# Patient Record
Sex: FEMALE | Race: BLACK OR AFRICAN AMERICAN | Employment: PART TIME | URBAN - METROPOLITAN AREA
[De-identification: names, ages, dates, MRNs, and addresses within clinical notes are randomized per-mention and may not be internally consistent; named-entity substitution may affect disease eponyms.]

---

## 2018-10-18 ENCOUNTER — HOSPITAL ENCOUNTER (EMERGENCY)
Age: 23
Discharge: HOME | End: 2018-10-18
Payer: MEDICAID

## 2018-10-18 VITALS
SYSTOLIC BLOOD PRESSURE: 106 MMHG | DIASTOLIC BLOOD PRESSURE: 77 MMHG | BODY MASS INDEX: 17.63 KG/M2 | OXYGEN SATURATION: 98 % | HEART RATE: 89 BPM | WEIGHT: 105.82 LBS | HEIGHT: 65 IN | RESPIRATION RATE: 16 BRPM | TEMPERATURE: 99.6 F

## 2018-10-18 DIAGNOSIS — M54.50 CHRONIC MIDLINE LOW BACK PAIN WITHOUT SCIATICA: ICD-10-CM

## 2018-10-18 DIAGNOSIS — R21 RASH AND OTHER NONSPECIFIC SKIN ERUPTION: Primary | ICD-10-CM

## 2018-10-18 DIAGNOSIS — G89.29 CHRONIC MIDLINE LOW BACK PAIN WITHOUT SCIATICA: ICD-10-CM

## 2018-10-18 LAB — HCG(URINE) PREGNANCY TEST: NEGATIVE

## 2018-10-18 PROCEDURE — 99283 EMERGENCY DEPT VISIT LOW MDM: CPT

## 2018-10-18 PROCEDURE — 84703 CHORIONIC GONADOTROPIN ASSAY: CPT

## 2018-10-18 RX ORDER — PRENATAL VIT 91/IRON/FOLIC/DHA 28-975-200
COMBINATION PACKAGE (EA) ORAL
Qty: 24 G | Refills: 0 | Status: SHIPPED | OUTPATIENT
Start: 2018-10-18 | End: 2018-12-02

## 2018-10-18 RX ORDER — IBUPROFEN 600 MG/1
600 TABLET ORAL EVERY 6 HOURS PRN
Qty: 30 TABLET | Refills: 0 | Status: SHIPPED | OUTPATIENT
Start: 2018-10-18 | End: 2018-11-16 | Stop reason: ALTCHOICE

## 2018-10-18 ASSESSMENT — PAIN SCALES - GENERAL: PAINLEVEL_OUTOF10: 6

## 2018-10-18 ASSESSMENT — PAIN DESCRIPTION - LOCATION: LOCATION: BACK

## 2018-10-18 ASSESSMENT — PAIN DESCRIPTION - PAIN TYPE: TYPE: CHRONIC PAIN

## 2018-10-18 ASSESSMENT — PAIN DESCRIPTION - ORIENTATION: ORIENTATION: LOWER

## 2018-10-18 NOTE — ED PROVIDER NOTES
[facility] [address] [address]  Dept: [address]: [phone]    eMERGENCY dEPARTMENT eNCOUnter    Evaluated by the Advanced Practice Provider    [address] [place] [address]    Chief Complaint   Patient presents with    Rash     3 days of rash on neck, used antibiotic ointment. new soap used. +itching. HPI    Odilon Martinez is a 26 y.o. female who presents with a rash. Onset was 3 days ago. The duration has been constant. The quality is that it itches. The location is on her neck. Patient complains of associated itching. The patient denies any new foods, medications, lotions or detergents. She has had a new soap, but it is for sensitive skin. She Has tried placing antibiotic ointment on it, but it did not help. She is also requesting a refill of Motrin that she takes for her chronic low back pain. She states she is not having any back pain at the moment, but she works as a , and sometimes longer nights on her feet bothering her back. She is not sure if she could be pregnant. She denies any radiation of her symptoms, extremity weakness, numbness in her groin, bowel or bladder incontinence, history of IV drug abuse or immunosuppression. She has no further complaints at this time. REVIEW OF SYSTEMS    Pulmonary: No difficulty breathing or chest tightness  Skin: see HPI  ENT: No throat swelling or tongue swelling  General: No fevers or syncope  All other systems reviewed and are negative. PAST MEDICAL & SURGICAL HISTORY    Past Medical History:   Diagnosis Date    ADHD     Asthma     Depression     Headache     Hip fracture ([address]     right hip stress fx 2016    Varicella      History reviewed. No pertinent surgical history.     CURRENT MEDICATIONS  (may include discharge medications prescribed in the ED)  Current Outpatient Rx   Medication Sig Dispense Refill    ibuprofen (ADVIL;MOTRIN) 600 MG tablet Take 1 tablet by mouth every 6 hours as needed for Pain 30 tablet 0    terbinafine (LAMISIL) 1 % cream Apply topically 2 times daily. 24 g 0    albuterol sulfate HFA (PROAIR HFA) 108 (90 Base) MCG/ACT inhaler Use every 4 hours while awake for 7-10 days then PRN wheezing  Dispense with SPACER and Instruct on use. May sub Ventolin or Proventil as needed per [employer]. 1 Inhaler 10       ALLERGIES    No Known Allergies    SOCIAL & FAMILY HISTORY    Social History     Social History    Marital status: Single     Spouse name: N/A    Number of children: N/A    Years of education: N/A     Social History Main Topics    Smoking status: Current Every Day Smoker     Types: Cigars    Smokeless tobacco: Never Used    Alcohol use Yes      Comment: occ    Drug use: Yes     Types: Marijuana      Comment: 1-2 times monthly    Sexual activity: No     Other Topics Concern    None     Social History Narrative    None     History reviewed. No pertinent family history. PHYSICAL EXAM    VITAL SIGNS: /77   Pulse 89   Temp 99.6 °F (37.6 °C) (Oral)   Resp 16   Ht 5' 4.5\" (1.638 m)   Wt 105 lb 13.1 oz (48 kg)   SpO2 98%   BMI 17.88 kg/m²   Constitutional:  Well developed, well nourished, no acute distress  HENT:  Atraumatic, no facial or lip swelling  ORAL EXAM:  No tongue swelling, airway patent  NECK:  Supple, No neck swelling  Respiratory:  No respiratory distress, normal breath sounds   Cardiovascular:  regular rate, No JVD, no edema  GI: nondistended, normal bowel sounds, nontender, no organomegaly   Musculoskeletal:  No edema, no acute deformities. With no lumbar tenderness on exam, no deformity, with full range of motion of lumbar back, normal gait. Neuro: Achilles and patellar reflexes 2+ bilaterally, sensation intact bilaterally  Integument:  With round, scaling, dry rash on right neck that is about the size of a $0.50 piece with mild erythema.  There are no petechiae, pustules, purpura, induration,

## 2018-11-16 ENCOUNTER — HOSPITAL ENCOUNTER (EMERGENCY)
Age: 23
Discharge: HOME | End: 2018-11-16
Attending: EMERGENCY MEDICINE
Payer: MEDICAID

## 2018-11-16 VITALS
TEMPERATURE: 98.7 F | DIASTOLIC BLOOD PRESSURE: 60 MMHG | RESPIRATION RATE: 16 BRPM | HEART RATE: 80 BPM | SYSTOLIC BLOOD PRESSURE: 92 MMHG | OXYGEN SATURATION: 98 %

## 2018-11-16 DIAGNOSIS — Z76.0 ENCOUNTER FOR MEDICATION REFILL: Primary | ICD-10-CM

## 2018-11-16 PROCEDURE — 99282 EMERGENCY DEPT VISIT SF MDM: CPT

## 2018-11-16 RX ORDER — ALBUTEROL SULFATE 90 UG/1
AEROSOL, METERED RESPIRATORY (INHALATION)
Qty: 1 INHALER | Refills: 0 | Status: SHIPPED | OUTPATIENT
Start: 2018-11-16 | End: 2018-12-02

## 2018-11-16 NOTE — ED PROVIDER NOTES
topically 2 times daily. 24 g 0     No Known Allergies  Nursing Notes Reviewed    ROS:  At least 10 systems reviewed and otherwise negative except as in the [address] Physical Exam:  ED Triage Vitals [11/16/18 1023]   Enc Vitals Group      BP 92/60      Pulse 80      Resp 16      Temp 98.7 °F (37.1 °C)      Temp Source Oral      SpO2 98 %      Weight       Height       Head Circumference       Peak Flow       Pain Score       Pain Loc       Pain Edu? Excl. in [address]? My pulse oximetry interpretation is which is within the normal range    GENERAL APPEARANCE: Awake and alert. Cooperative. No acute distress. HEAD:  Atraumatic. EYES: EOM's grossly intact. ENT: Mucous membranes are moist.  No trismus. NECK:  Trachea midline. HEART: RRR. Radial pulses 2+. LUNGS: Respirations unlabored. CTAB  ABDOMEN: Soft. Non-tender. No guarding or rebound. EXTREMITIES: No acute deformities. SKIN: Warm and dry. No rash      I have reviewed and interpreted all of the currently available lab results from this visit (if applicable):  No results found for this visit on 11/16/18. EKG: (All EKG's are interpreted by myself in the absence of a cardiologist)      MDM:  Patient's vital signs are stable. She appears in no acute distress. Her lung exam is normal.  I did refill her albuterol inhaler. I do not appreciate any rash but it likely that the patient has some type of eczema or atopic dermatitis. I instructed her to use hydrocortisone as needed if this rash were to return. I did give her PCP referral.    Clinical Impression:  1.  Encounter for medication refill        Disposition Vitals:  [unfilled], [unfilled], [unfilled], [unfilled]    Disposition referral (if applicable):  [facility] Pre-Services  [phone]          Disposition medications (if applicable):  Discharge Medication List as of 11/16/2018 11:00 AM            (Please note that portions of this note may have been completed with a voice

## 2018-12-02 ENCOUNTER — HOSPITAL ENCOUNTER (EMERGENCY)
Age: 23
Discharge: ANOTHER HOSPITAL | End: 2018-12-02
Attending: EMERGENCY MEDICINE
Payer: MEDICAID

## 2018-12-02 VITALS
SYSTOLIC BLOOD PRESSURE: 103 MMHG | DIASTOLIC BLOOD PRESSURE: 69 MMHG | HEART RATE: 71 BPM | TEMPERATURE: 98.5 F | BODY MASS INDEX: 17.93 KG/M2 | OXYGEN SATURATION: 100 % | HEIGHT: 64 IN | WEIGHT: 105 LBS | RESPIRATION RATE: 14 BRPM

## 2018-12-02 DIAGNOSIS — O21.9 VOMITING OF PREGNANCY, ANTEPARTUM: Primary | ICD-10-CM

## 2018-12-02 DIAGNOSIS — N89.8 VAGINAL DISCHARGE: ICD-10-CM

## 2018-12-02 DIAGNOSIS — R11.0 NAUSEA: ICD-10-CM

## 2018-12-02 LAB
BILIRUBIN URINE: NEGATIVE
BLOOD, URINE: ABNORMAL
CLARITY: CLEAR
COLOR: YELLOW
EPITHELIAL CELLS, UA: ABNORMAL /HPF
GLUCOSE URINE: NEGATIVE MG/DL
HCG(URINE) PREGNANCY TEST: POSITIVE
KETONES, URINE: NEGATIVE MG/DL
LEUKOCYTE ESTERASE, URINE: NEGATIVE
MICROSCOPIC EXAMINATION: YES
MUCUS: ABNORMAL /LPF
NITRITE, URINE: NEGATIVE
PH UA: 6.5
PROTEIN UA: NEGATIVE MG/DL
RBC UA: ABNORMAL /HPF (ref 0–2)
SPECIFIC GRAVITY UA: 1.01
URINE REFLEX TO CULTURE: ABNORMAL
URINE TYPE: ABNORMAL
UROBILINOGEN, URINE: 0.2 E.U./DL
WBC UA: ABNORMAL /HPF (ref 0–5)

## 2018-12-02 PROCEDURE — 6360000002 HC RX W HCPCS: Performed by: EMERGENCY MEDICINE

## 2018-12-02 PROCEDURE — 84703 CHORIONIC GONADOTROPIN ASSAY: CPT

## 2018-12-02 PROCEDURE — 99283 EMERGENCY DEPT VISIT LOW MDM: CPT

## 2018-12-02 PROCEDURE — 81001 URINALYSIS AUTO W/SCOPE: CPT

## 2018-12-02 RX ORDER — ONDANSETRON 4 MG/1
4 TABLET, ORALLY DISINTEGRATING ORAL ONCE
Status: COMPLETED | OUTPATIENT
Start: 2018-12-02 | End: 2018-12-02

## 2018-12-02 RX ORDER — ONDANSETRON 4 MG/1
4 TABLET, ORALLY DISINTEGRATING ORAL EVERY 8 HOURS PRN
Qty: 20 TABLET | Refills: 0 | Status: SHIPPED | OUTPATIENT
Start: 2018-12-02 | End: 2018-12-09 | Stop reason: ALTCHOICE

## 2018-12-02 RX ADMIN — ONDANSETRON 4 MG: 4 TABLET, ORALLY DISINTEGRATING ORAL at 06:12

## 2018-12-02 ASSESSMENT — PAIN SCALES - GENERAL: PAINLEVEL_OUTOF10: 5

## 2018-12-02 ASSESSMENT — PAIN - FUNCTIONAL ASSESSMENT: PAIN_FUNCTIONAL_ASSESSMENT: 0-10

## 2018-12-02 NOTE — ED PROVIDER NOTES
Narrative:     Performed at:  [facility]) [facility]  [address] [place], [facility]   Phone [phone]   HCG, QUANTITATIVE, PREGNANCY   CBC   BASIC METABOLIC PANEL   ABO/RH       All other labs were within normal range or not returned as of this dictation. EMERGENCY DEPARTMENT COURSE and DIFFERENTIAL DIAGNOSIS/MDM:   Vitals:    Vitals:    12/02/18 0545   BP: 103/69   Pulse: 71   Resp: 14   Temp: 98.5 °F (36.9 °C)   TempSrc: Oral   SpO2: 100%   Weight: 105 lb (47.6 kg)   Height: 5' 4\" (1.626 m)       MDM  Pt presents with nausea. At presentation VSS. On exam no abdominal TTP. Urine preg is positive. Urinalysis shows blood. Declines pelvic. Will send patient to [place] for ectopic rule out ultrasound and possible RhoGAM if type if Rh -. Will have them draw blood as we do not have machine for blood typing. REASSESSMENT          CRITICAL CARE TIME   Total Critical Care time was 0 minutes, excluding separately reportable procedures. There was a high probability of clinically significant/life threatening deteriorationin the patient's condition which required my urgent intervention. CONSULTS:  None     PROCEDURES:  Unless otherwise noted below, none     Procedures    FINAL IMPRESSION      1. Vomiting of pregnancy, antepartum    2. Nausea    3.  Vaginal discharge          DISPOSITION/PLAN   DISPOSITION Decision To Transfer 12/02/2018 06:29:47 AM      PATIENT REFERRED TO:  [facility] [id number]  [address] Pre-Services  [phone]          DISCHARGE MEDICATIONS:  New Prescriptions    ONDANSETRON (ZOFRAN ODT) 4 MG DISINTEGRATING TABLET    Take 1 tablet by mouth every 8 hours as needed for Nausea          (Please note that portions of this note were completed with a voicerecognition program.  Efforts were made to edit the dictations but occasionally words are

## 2018-12-02 NOTE — ED NOTES
Reviewed instructions with patient on importance of going to [facility] for further testing.  Patient verb under, to [facility] per self     Ana Hale RN  12/02/18 1073

## 2018-12-09 ENCOUNTER — HOSPITAL ENCOUNTER (EMERGENCY)
Age: 23
Discharge: HOME | End: 2018-12-09
Attending: EMERGENCY MEDICINE
Payer: MEDICAID

## 2018-12-09 VITALS
HEART RATE: 71 BPM | HEIGHT: 65 IN | WEIGHT: 107.58 LBS | OXYGEN SATURATION: 99 % | TEMPERATURE: 98.2 F | BODY MASS INDEX: 17.92 KG/M2 | RESPIRATION RATE: 16 BRPM | SYSTOLIC BLOOD PRESSURE: 109 MMHG | DIASTOLIC BLOOD PRESSURE: 77 MMHG

## 2018-12-09 DIAGNOSIS — O20.9 FIRST TRIMESTER BLEEDING: ICD-10-CM

## 2018-12-09 DIAGNOSIS — O21.9 NAUSEA/VOMITING IN PREGNANCY: Primary | ICD-10-CM

## 2018-12-09 LAB
ABO/RH: NORMAL
ANION GAP SERPL CALCULATED.3IONS-SCNC: 12 MMOL/L (ref 3–16)
BASOPHILS ABSOLUTE: 0 K/UL (ref 0–0.2)
BASOPHILS RELATIVE PERCENT: 0.3 %
BILIRUBIN URINE: NEGATIVE
BLOOD, URINE: NEGATIVE
BUN BLDV-MCNC: 8 MG/DL (ref 7–20)
CALCIUM SERPL-MCNC: 9.4 MG/DL (ref 8.3–10.6)
CHLORIDE BLD-SCNC: 102 MMOL/L (ref 99–110)
CLARITY: CLEAR
CO2: 23 MMOL/L (ref 21–32)
COLOR: YELLOW
CREAT SERPL-MCNC: <0.5 MG/DL (ref 0.6–1.1)
EOSINOPHILS ABSOLUTE: 0 K/UL (ref 0–0.6)
EOSINOPHILS RELATIVE PERCENT: 0.1 %
GFR AFRICAN AMERICAN: >60
GFR NON-AFRICAN AMERICAN: >60
GLUCOSE BLD-MCNC: 87 MG/DL (ref 70–99)
GLUCOSE URINE: NEGATIVE MG/DL
GONADOTROPIN, CHORIONIC (HCG) QUANT: NORMAL MIU/ML
HCT VFR BLD CALC: 36.1 % (ref 36–48)
HEMOGLOBIN: 12.4 G/DL (ref 12–16)
KETONES, URINE: 15 MG/DL
LEUKOCYTE ESTERASE, URINE: NEGATIVE
LYMPHOCYTES ABSOLUTE: 1.3 K/UL (ref 1–5.1)
LYMPHOCYTES RELATIVE PERCENT: 14.9 %
MCH RBC QN AUTO: 32.6 PG (ref 26–34)
MCHC RBC AUTO-ENTMCNC: 34.4 G/DL (ref 31–36)
MCV RBC AUTO: 94.9 FL (ref 80–100)
MICROSCOPIC EXAMINATION: ABNORMAL
MONOCYTES ABSOLUTE: 0.9 K/UL (ref 0–1.3)
MONOCYTES RELATIVE PERCENT: 10.9 %
NEUTROPHILS ABSOLUTE: 6.2 K/UL (ref 1.7–7.7)
NEUTROPHILS RELATIVE PERCENT: 73.8 %
NITRITE, URINE: NEGATIVE
PDW BLD-RTO: 12.2 % (ref 12.4–15.4)
PH UA: 7
PLATELET # BLD: 216 K/UL (ref 135–450)
PMV BLD AUTO: 8.1 FL (ref 5–10.5)
POTASSIUM REFLEX MAGNESIUM: 3.7 MMOL/L (ref 3.5–5.1)
PROTEIN UA: NEGATIVE MG/DL
RBC # BLD: 3.8 M/UL (ref 4–5.2)
SODIUM BLD-SCNC: 137 MMOL/L (ref 136–145)
SPECIFIC GRAVITY UA: 1.01
URINE REFLEX TO CULTURE: ABNORMAL
URINE TYPE: ABNORMAL
UROBILINOGEN, URINE: 0.2 E.U./DL
WBC # BLD: 8.4 K/UL (ref 4–11)

## 2018-12-09 PROCEDURE — 96361 HYDRATE IV INFUSION ADD-ON: CPT

## 2018-12-09 PROCEDURE — 6360000002 HC RX W HCPCS: Performed by: EMERGENCY MEDICINE

## 2018-12-09 PROCEDURE — 86900 BLOOD TYPING SEROLOGIC ABO: CPT

## 2018-12-09 PROCEDURE — 80048 BASIC METABOLIC PNL TOTAL CA: CPT

## 2018-12-09 PROCEDURE — 2580000003 HC RX 258: Performed by: EMERGENCY MEDICINE

## 2018-12-09 PROCEDURE — 99283 EMERGENCY DEPT VISIT LOW MDM: CPT

## 2018-12-09 PROCEDURE — 6370000000 HC RX 637 (ALT 250 FOR IP): Performed by: EMERGENCY MEDICINE

## 2018-12-09 PROCEDURE — 96374 THER/PROPH/DIAG INJ IV PUSH: CPT

## 2018-12-09 PROCEDURE — 81003 URINALYSIS AUTO W/O SCOPE: CPT

## 2018-12-09 PROCEDURE — 85025 COMPLETE CBC W/AUTO DIFF WBC: CPT

## 2018-12-09 PROCEDURE — 36415 COLL VENOUS BLD VENIPUNCTURE: CPT

## 2018-12-09 PROCEDURE — 84702 CHORIONIC GONADOTROPIN TEST: CPT

## 2018-12-09 PROCEDURE — 86901 BLOOD TYPING SEROLOGIC RH(D): CPT

## 2018-12-09 RX ORDER — 0.9 % SODIUM CHLORIDE 0.9 %
500 INTRAVENOUS SOLUTION INTRAVENOUS ONCE
Status: COMPLETED | OUTPATIENT
Start: 2018-12-09 | End: 2018-12-09

## 2018-12-09 RX ORDER — ACETAMINOPHEN 500 MG
500 TABLET ORAL 4 TIMES DAILY PRN
Qty: 60 TABLET | Refills: 0 | Status: SHIPPED | OUTPATIENT
Start: 2018-12-09 | End: 2018-12-16 | Stop reason: SDUPTHER

## 2018-12-09 RX ORDER — ONDANSETRON 4 MG/1
4 TABLET, ORALLY DISINTEGRATING ORAL EVERY 8 HOURS PRN
Qty: 20 TABLET | Refills: 0 | Status: SHIPPED | OUTPATIENT
Start: 2018-12-09 | End: 2018-12-16 | Stop reason: SDUPTHER

## 2018-12-09 RX ORDER — ONDANSETRON 2 MG/ML
4 INJECTION INTRAMUSCULAR; INTRAVENOUS ONCE
Status: COMPLETED | OUTPATIENT
Start: 2018-12-09 | End: 2018-12-09

## 2018-12-09 RX ORDER — DOXYLAMINE SUCCINATE AND PYRIDOXINE HYDROCHLORIDE, DELAYED RELEASE TABLETS 10 MG/10 MG 10; 10 MG/1; MG/1
TABLET, DELAYED RELEASE ORAL
Qty: 60 TABLET | Refills: 1 | Status: SHIPPED | OUTPATIENT
Start: 2018-12-09 | End: 2019-02-22 | Stop reason: ALTCHOICE

## 2018-12-09 RX ORDER — ACETAMINOPHEN 325 MG/1
650 TABLET ORAL ONCE
Status: COMPLETED | OUTPATIENT
Start: 2018-12-09 | End: 2018-12-09

## 2018-12-09 RX ADMIN — ONDANSETRON 4 MG: 2 INJECTION INTRAMUSCULAR; INTRAVENOUS at 06:31

## 2018-12-09 RX ADMIN — SODIUM CHLORIDE 500 ML: 9 INJECTION, SOLUTION INTRAVENOUS at 06:31

## 2018-12-09 RX ADMIN — ACETAMINOPHEN 650 MG: 325 TABLET, FILM COATED ORAL at 06:31

## 2018-12-09 ASSESSMENT — PAIN SCALES - GENERAL: PAINLEVEL_OUTOF10: 0

## 2018-12-09 NOTE — ED PROVIDER NOTES
Packs/day: 0.10     Types: Cigars    Smokeless tobacco: Never Used    Alcohol use Yes      Comment: occ    Drug use: Yes     Types: Marijuana      Comment: 1-2 times monthly    Sexual activity: No     Other Topics Concern    Not on file     Social History Narrative    No narrative on file       SCREENINGS             PHYSICAL EXAM    (up to 7 for level 4, 8 or more for level 5)     ED Triage Vitals [12/09/18 0552]   BP Temp Temp Source Pulse Resp SpO2 Height Weight   109/77 98.2 °F (36.8 °C) Oral 71 16 99 % 5' 4.5\" (1.638 m) 107 lb 9.4 oz (48.8 kg)       Physical Exam   Constitutional: She appears well-developed and well-nourished. HENT:   Head: Normocephalic and atraumatic. Dried lips but moist mucous membranes   Eyes: Conjunctivae are normal. Right eye exhibits no discharge. Left eye exhibits no discharge. Cardiovascular: Normal rate. Pulmonary/Chest: Effort normal. No respiratory distress. Abdominal: Soft. She exhibits no distension. Mild suprapubic tenderness without any rebound rigidity or guarding, no CVA tenderness. Genitourinary: Pelvic exam was performed with patient supine. Genitourinary Comments: No gross external bleeding. On bimanual exam the patient's cervix is long and closed. Scant amount of brown discharge on gloves. Minimal tenderness   Neurological: She is alert. Skin: No pallor. Psychiatric: She has a normal mood and affect. Her behavior is normal.   Nursing note and vitals reviewed.           DIAGNOSTIC RESULTS     EKG: All EKG's are interpreted by the Emergency Department Physician who either signs or Co-signsthis chart in the absence of a cardiologist.        RADIOLOGY:   Non-plain filmimages such as CT, Ultrasound and MRI are read by the radiologist. Plain radiographic images are visualized and preliminarily interpreted by the emergency physician with the below findings:        Interpretation per the Radiologist below, if available at the time ofthis note:    No

## 2018-12-11 ENCOUNTER — HOSPITAL ENCOUNTER (EMERGENCY)
Age: 23
Discharge: HOME | End: 2018-12-11
Payer: MEDICAID

## 2018-12-11 VITALS
HEIGHT: 65 IN | TEMPERATURE: 98.8 F | WEIGHT: 105.82 LBS | DIASTOLIC BLOOD PRESSURE: 71 MMHG | RESPIRATION RATE: 12 BRPM | BODY MASS INDEX: 17.63 KG/M2 | HEART RATE: 77 BPM | SYSTOLIC BLOOD PRESSURE: 117 MMHG | OXYGEN SATURATION: 97 %

## 2018-12-11 DIAGNOSIS — R10.30 PREGNANCY RELATED BILATERAL LOWER ABDOMINAL CRAMPING, ANTEPARTUM: Primary | ICD-10-CM

## 2018-12-11 DIAGNOSIS — N93.9 VAGINAL SPOTTING: ICD-10-CM

## 2018-12-11 DIAGNOSIS — O99.891 PREGNANCY RELATED BILATERAL LOWER ABDOMINAL CRAMPING, ANTEPARTUM: Primary | ICD-10-CM

## 2018-12-11 LAB
ANION GAP SERPL CALCULATED.3IONS-SCNC: 14 MMOL/L (ref 3–16)
BASOPHILS ABSOLUTE: 0 K/UL (ref 0–0.2)
BASOPHILS RELATIVE PERCENT: 0.4 %
BILIRUBIN URINE: NEGATIVE
BLOOD, URINE: ABNORMAL
BUN BLDV-MCNC: 9 MG/DL (ref 7–20)
CALCIUM SERPL-MCNC: 9.4 MG/DL (ref 8.3–10.6)
CHLORIDE BLD-SCNC: 100 MMOL/L (ref 99–110)
CLARITY: CLEAR
CO2: 22 MMOL/L (ref 21–32)
COLOR: YELLOW
CREAT SERPL-MCNC: <0.5 MG/DL (ref 0.6–1.1)
EOSINOPHILS ABSOLUTE: 0 K/UL (ref 0–0.6)
EOSINOPHILS RELATIVE PERCENT: 0.3 %
EPITHELIAL CELLS, UA: 4 /HPF (ref 0–5)
GFR AFRICAN AMERICAN: >60
GFR NON-AFRICAN AMERICAN: >60
GLUCOSE BLD-MCNC: 84 MG/DL (ref 70–99)
GLUCOSE URINE: NEGATIVE MG/DL
GONADOTROPIN, CHORIONIC (HCG) QUANT: NORMAL MIU/ML
HCT VFR BLD CALC: 37.3 % (ref 36–48)
HEMOGLOBIN: 12.5 G/DL (ref 12–16)
HYALINE CASTS: 14 /LPF (ref 0–8)
KETONES, URINE: >=80 MG/DL
LEUKOCYTE ESTERASE, URINE: ABNORMAL
LYMPHOCYTES ABSOLUTE: 1.4 K/UL (ref 1–5.1)
LYMPHOCYTES RELATIVE PERCENT: 15.3 %
MAGNESIUM: 1.8 MG/DL (ref 1.8–2.4)
MCH RBC QN AUTO: 31.9 PG (ref 26–34)
MCHC RBC AUTO-ENTMCNC: 33.5 G/DL (ref 31–36)
MCV RBC AUTO: 95 FL (ref 80–100)
MICROSCOPIC EXAMINATION: YES
MONOCYTES ABSOLUTE: 1.1 K/UL (ref 0–1.3)
MONOCYTES RELATIVE PERCENT: 12.5 %
NEUTROPHILS ABSOLUTE: 6.3 K/UL (ref 1.7–7.7)
NEUTROPHILS RELATIVE PERCENT: 71.5 %
NITRITE, URINE: NEGATIVE
PDW BLD-RTO: 12.3 % (ref 12.4–15.4)
PH UA: 6
PLATELET # BLD: 233 K/UL (ref 135–450)
PMV BLD AUTO: 7.2 FL (ref 5–10.5)
POTASSIUM REFLEX MAGNESIUM: 3.5 MMOL/L (ref 3.5–5.1)
PROTEIN UA: ABNORMAL MG/DL
RBC # BLD: 3.93 M/UL (ref 4–5.2)
RBC UA: 2 /HPF (ref 0–4)
SODIUM BLD-SCNC: 136 MMOL/L (ref 136–145)
SPECIFIC GRAVITY UA: 1.03
URINE REFLEX TO CULTURE: YES
URINE TYPE: ABNORMAL
UROBILINOGEN, URINE: 1 E.U./DL
WBC # BLD: 8.9 K/UL (ref 4–11)
WBC UA: 5 /HPF (ref 0–5)

## 2018-12-11 PROCEDURE — 83735 ASSAY OF MAGNESIUM: CPT

## 2018-12-11 PROCEDURE — 84702 CHORIONIC GONADOTROPIN TEST: CPT

## 2018-12-11 PROCEDURE — 99284 EMERGENCY DEPT VISIT MOD MDM: CPT

## 2018-12-11 PROCEDURE — 87086 URINE CULTURE/COLONY COUNT: CPT

## 2018-12-11 PROCEDURE — 80048 BASIC METABOLIC PNL TOTAL CA: CPT

## 2018-12-11 PROCEDURE — 81001 URINALYSIS AUTO W/SCOPE: CPT

## 2018-12-11 PROCEDURE — 85025 COMPLETE CBC W/AUTO DIFF WBC: CPT

## 2018-12-11 ASSESSMENT — PAIN SCALES - GENERAL: PAINLEVEL_OUTOF10: 10

## 2018-12-11 ASSESSMENT — ENCOUNTER SYMPTOMS
CHEST TIGHTNESS: 0
VOMITING: 0
ABDOMINAL PAIN: 1
SHORTNESS OF BREATH: 0
NAUSEA: 0
COLOR CHANGE: 0

## 2018-12-11 ASSESSMENT — PAIN DESCRIPTION - DESCRIPTORS: DESCRIPTORS: CRAMPING

## 2018-12-11 ASSESSMENT — PAIN DESCRIPTION - LOCATION: LOCATION: ABDOMEN

## 2018-12-11 NOTE — ED PROVIDER NOTES
[facility]  eMERGENCY dEPARTMENT eNCOUnter        Pt Name: Cintia Gomes  MRN: [id number]  [place] 1995  Date of evaluation: 2018  Provider: Vanesa Ma PA-C  PCP: No primary care provider on file. This patient was not seen and evaluated by the attending physician       [address] [place] [address]       Chief Complaint   Patient presents with    Abdominal Pain     pt c/o abdominal cramping and spotting for 3 days       HISTORY OF PRESENT ILLNESS   (Location/Symptom, Timing/Onset, Context/Setting, Quality, Duration, Modifying Factors, Severity)  Note limiting factors. Cintia Gomes is a 26 y.o. female presents emergency department by private vehicle complaining of lower abdominal cramping and vaginal spotting. Patient currently 7 weeks pregnant. Has had ultrasound confirming intrauterine pregnancy. Has not been seen by Zach Valencia for this pregnancy. Patient . Patient blood type O positive. Patient states symptoms began 3 days ago and have been intermittent since. Cramping rated 10/10 when present. Cramping comes and goes and is located centrally. Vaginal spotting has been intermittent initially was bright red, now dark brown color. Has been wearing a pad, has not had to change pads right really. No passage of clots or tissue. She has has mild nausea and vomiting associated with early pregnancy. These antiemetics have helped nausea vomiting, denies currently. Denies any fevers, chills, urinary frequency/urgency/hesitancy. Denies any concern for STDs. Nursing Notes were all reviewed and agreed with or any disagreements were addressed  in the HPI. REVIEW OF SYSTEMS    (2-9 systems for level 4, 10 or more for level 5)     Review of Systems   Constitutional: Negative for chills and fever. HENT: Negative. Respiratory: Negative for chest tightness and shortness of breath. Cardiovascular: Negative.     Gastrointestinal: Positive for medications:  Medications - No data to display    Patient  presents to the ED with the HPI noted above. She is hemodynamically stable, afebrile and nontoxic-appearing. She is not hypoxic with oxygen saturation 97% on room air. Differential diagnosis includes threatened , incomplete , inevitable , urinary tract infection, STD, subchorionic hemorrhage, implantation bleeding, other. Patient states she's had intrauterine pregnancy confirming IUP. Unable to find these results and her medical records. My attending did a bedside ultrasound which showed single live intrauterine pregnancy with estimated gestational age is 7 weeks 0 days. Heart rate at 148 bpm.  Beta hCG quantitative 102,500, this trending appropriately from previous on 18 at 45,709. Physical exam as above. Urinalysis showed ketones, no evidence of acute infection. CBC showed no leukocytosis or anemia. No additional workup indicated this time. Patient told to continue hydrating at home. Tolerate oral PO in ED without difficulty. Has not picked up medications prescribed at previous evaluation for nausea and vomiting in early pregnancy. Patient has an ObGyn, told to follow-up in 2 days if possible for reevaluation. Patient was understanding. Total contact her ObGyn and informed of symptoms. Educated on no intercourse, no heavy lifting until cleared by [place]. Patient discharged home in stable condition. FINAL IMPRESSION      1. Pregnancy related bilateral lower abdominal cramping, antepartum    2. Vaginal spotting          DISPOSITION/PLAN   DISPOSITION Decision To Discharge 2018 02:16:19 PM      PATIENT REFERREDTO:  [facility] Emergency Department   [facility]  [phone]  Go to   If symptoms worsen    [facility]  [address]   [place] [address]  [address] [facility] [address] [facility]  Call in 2 days  For follow up and reevaluation. (your OBGYN to arrange for close follow up and reevaluation, inform them of your symptoms and that you were in ED)      DISCHARGE MEDICATIONS:  New Prescriptions    No medications on file       DISCONTINUED MEDICATIONS:  Discontinued Medications    No medications on file              (Please note that portions ofthis note were completed with a voice recognition program.  Efforts were made to edit the dictations but occasionally words are mis-transcribed.)    Tomás Holden PA-C (electronically signed)           Tomás Holden PA-C  12/11/18 5047

## 2018-12-12 LAB — URINE CULTURE, ROUTINE: NORMAL

## 2018-12-16 ENCOUNTER — HOSPITAL ENCOUNTER (EMERGENCY)
Age: 23
Discharge: HOME | End: 2018-12-16
Payer: MEDICAID

## 2018-12-16 VITALS
BODY MASS INDEX: 17.78 KG/M2 | HEIGHT: 65 IN | WEIGHT: 106.7 LBS | HEART RATE: 75 BPM | SYSTOLIC BLOOD PRESSURE: 124 MMHG | RESPIRATION RATE: 16 BRPM | TEMPERATURE: 97.7 F | OXYGEN SATURATION: 100 % | DIASTOLIC BLOOD PRESSURE: 71 MMHG

## 2018-12-16 DIAGNOSIS — O20.0 THREATENED MISCARRIAGE: Primary | ICD-10-CM

## 2018-12-16 LAB
A/G RATIO: 1.5 (ref 1.1–2.2)
ALBUMIN SERPL-MCNC: 4.5 G/DL (ref 3.4–5)
ALP BLD-CCNC: 41 U/L (ref 40–129)
ALT SERPL-CCNC: 12 U/L (ref 10–40)
ANION GAP SERPL CALCULATED.3IONS-SCNC: 12 MMOL/L (ref 3–16)
AST SERPL-CCNC: 12 U/L (ref 15–37)
BASOPHILS ABSOLUTE: 0 K/UL (ref 0–0.2)
BASOPHILS RELATIVE PERCENT: 0.2 %
BILIRUB SERPL-MCNC: 0.8 MG/DL (ref 0–1)
BUN BLDV-MCNC: 8 MG/DL (ref 7–20)
CALCIUM SERPL-MCNC: 9.6 MG/DL (ref 8.3–10.6)
CHLORIDE BLD-SCNC: 100 MMOL/L (ref 99–110)
CO2: 23 MMOL/L (ref 21–32)
CREAT SERPL-MCNC: <0.5 MG/DL (ref 0.6–1.1)
EOSINOPHILS ABSOLUTE: 0 K/UL (ref 0–0.6)
EOSINOPHILS RELATIVE PERCENT: 0.3 %
GFR AFRICAN AMERICAN: >60
GFR NON-AFRICAN AMERICAN: >60
GLOBULIN: 3.1 G/DL
GLUCOSE BLD-MCNC: 80 MG/DL (ref 70–99)
GONADOTROPIN, CHORIONIC (HCG) QUANT: NORMAL MIU/ML
HCT VFR BLD CALC: 39 % (ref 36–48)
HEMOGLOBIN: 13.2 G/DL (ref 12–16)
LIPASE: 12 U/L (ref 13–60)
LYMPHOCYTES ABSOLUTE: 1.5 K/UL (ref 1–5.1)
LYMPHOCYTES RELATIVE PERCENT: 17.7 %
MCH RBC QN AUTO: 32 PG (ref 26–34)
MCHC RBC AUTO-ENTMCNC: 33.9 G/DL (ref 31–36)
MCV RBC AUTO: 94.3 FL (ref 80–100)
MONOCYTES ABSOLUTE: 0.9 K/UL (ref 0–1.3)
MONOCYTES RELATIVE PERCENT: 11.4 %
NEUTROPHILS ABSOLUTE: 5.8 K/UL (ref 1.7–7.7)
NEUTROPHILS RELATIVE PERCENT: 70.4 %
PDW BLD-RTO: 12 % (ref 12.4–15.4)
PLATELET # BLD: 245 K/UL (ref 135–450)
PMV BLD AUTO: 7.6 FL (ref 5–10.5)
POTASSIUM REFLEX MAGNESIUM: 3.7 MMOL/L (ref 3.5–5.1)
RBC # BLD: 4.14 M/UL (ref 4–5.2)
SODIUM BLD-SCNC: 135 MMOL/L (ref 136–145)
TOTAL PROTEIN: 7.6 G/DL (ref 6.4–8.2)
WBC # BLD: 8.2 K/UL (ref 4–11)

## 2018-12-16 PROCEDURE — 99284 EMERGENCY DEPT VISIT MOD MDM: CPT

## 2018-12-16 PROCEDURE — 96361 HYDRATE IV INFUSION ADD-ON: CPT

## 2018-12-16 PROCEDURE — 2580000003 HC RX 258: Performed by: PHYSICIAN ASSISTANT

## 2018-12-16 PROCEDURE — 83690 ASSAY OF LIPASE: CPT

## 2018-12-16 PROCEDURE — 84702 CHORIONIC GONADOTROPIN TEST: CPT

## 2018-12-16 PROCEDURE — 6360000002 HC RX W HCPCS: Performed by: PHYSICIAN ASSISTANT

## 2018-12-16 PROCEDURE — 80053 COMPREHEN METABOLIC PANEL: CPT

## 2018-12-16 PROCEDURE — 85025 COMPLETE CBC W/AUTO DIFF WBC: CPT

## 2018-12-16 PROCEDURE — 96374 THER/PROPH/DIAG INJ IV PUSH: CPT

## 2018-12-16 RX ORDER — ONDANSETRON 4 MG/1
4-8 TABLET, ORALLY DISINTEGRATING ORAL EVERY 8 HOURS PRN
Qty: 20 TABLET | Refills: 0 | Status: SHIPPED | OUTPATIENT
Start: 2018-12-16 | End: 2019-02-22 | Stop reason: ALTCHOICE

## 2018-12-16 RX ORDER — ACETAMINOPHEN 500 MG
1000 TABLET ORAL EVERY 6 HOURS PRN
Qty: 30 TABLET | Refills: 0 | Status: SHIPPED | OUTPATIENT
Start: 2018-12-16 | End: 2018-12-27

## 2018-12-16 RX ORDER — SODIUM CHLORIDE, SODIUM LACTATE, POTASSIUM CHLORIDE, CALCIUM CHLORIDE 600; 310; 30; 20 MG/100ML; MG/100ML; MG/100ML; MG/100ML
1000 INJECTION, SOLUTION INTRAVENOUS ONCE
Status: COMPLETED | OUTPATIENT
Start: 2018-12-16 | End: 2018-12-16

## 2018-12-16 RX ORDER — ONDANSETRON 2 MG/ML
4 INJECTION INTRAMUSCULAR; INTRAVENOUS EVERY 30 MIN PRN
Status: DISCONTINUED | OUTPATIENT
Start: 2018-12-16 | End: 2018-12-16 | Stop reason: HOSPADM

## 2018-12-16 RX ORDER — ACETAMINOPHEN 500 MG
1000 TABLET ORAL ONCE
Status: DISCONTINUED | OUTPATIENT
Start: 2018-12-16 | End: 2018-12-16 | Stop reason: HOSPADM

## 2018-12-16 RX ADMIN — ONDANSETRON 4 MG: 2 INJECTION INTRAMUSCULAR; INTRAVENOUS at 09:43

## 2018-12-16 RX ADMIN — SODIUM CHLORIDE, POTASSIUM CHLORIDE, SODIUM LACTATE AND CALCIUM CHLORIDE 1000 ML: 600; 310; 30; 20 INJECTION, SOLUTION INTRAVENOUS at 09:43

## 2018-12-16 ASSESSMENT — PAIN DESCRIPTION - PAIN TYPE: TYPE: ACUTE PAIN

## 2018-12-16 ASSESSMENT — PAIN SCALES - GENERAL
PAINLEVEL_OUTOF10: 5
PAINLEVEL_OUTOF10: 0

## 2018-12-16 NOTE — ED PROVIDER NOTES
increase to 1 tab p.o. q.a.m., 1 tab p.o. q. midafternoon and 2 tabs p.o. q.h.s.  4 tabs per day. If unable to afford, instruct the patient about substituting the OTC components. 60 tablet 1       ALLERGIES    No Known Allergies    FAMILY AND SOCIAL HISTORY    No family history on file. Social History     Social History    Marital status: Single     Spouse name: N/A    Number of children: N/A    Years of education: N/A     Social History Main Topics    Smoking status: Former Smoker    Smokeless tobacco: Never Used    Alcohol use No    Drug use: No      Comment: 1-2 times monthly    Sexual activity: No     Other Topics Concern    Not on file     Social History Narrative    No narrative on file       PHYSICAL EXAM    VITAL SIGNS: /68   Pulse 75   Temp 97.7 °F (36.5 °C) (Oral)   Resp 16   Ht 5' 4.5\" (1.638 m)   Wt 106 lb 11.2 oz (48.4 kg)   LMP 10/21/2018   SpO2 100%   BMI 18.03 kg/m²    Constitutional:  Well-developed, well-nourished, appears comfortable  Eyes:  Non-icteric sclera, no conjunctival injection   HENT:  Atraumatic, external nose normal.   NECK: Supple, no JVD   Respiratory:  No respiratory distress, normal breath sounds   Cardiovascular:  regular rate, no murmurs  GI:  Soft, +mild suprapubic abdominal tenderness, bowel sounds unremarkable   Musculoskeletal: No edema, no deformities  Integument:  Nondiaphoretic skin, no obvious rashes  Neurologic: Awake and oriented, no slurred speech    ED COURSE & MEDICAL DECISION MAKING    Pertinent Labs & Imaging studies reviewed and interpreted. (See chart for details)  See chart for details of medications given during the ED stay.     Vitals:    12/16/18 0821 12/16/18 0830 12/16/18 0845 12/16/18 0900   BP: 112/71 113/62 105/70 104/68   Pulse: 75      Resp: 16      Temp: 97.7 °F (36.5 °C)      TempSrc: Oral      SpO2: 100% 100% 100% 100%   Weight:       Height:           Differential diagnosis: ectopic pregnancy, molar pregnancy, miscarriage,

## 2018-12-16 NOTE — ED NOTES
Patient verbalized understanding of discharge instructions and follow-up care. Patient was given 3 prescriptions and verbalized understanding of instructions for said prescriptions. Breathing unlabored, skin warm, patient alert. Patient ambulated out of emergency department with steady gait to private vehicle.        Fernando Matt RN  12/16/18 0313

## 2018-12-22 ENCOUNTER — APPOINTMENT (OUTPATIENT)
Dept: ULTRASOUND IMAGING | Age: 23
End: 2018-12-22
Payer: MEDICAID

## 2018-12-22 ENCOUNTER — HOSPITAL ENCOUNTER (EMERGENCY)
Age: 23
Discharge: HOME | End: 2018-12-22
Attending: EMERGENCY MEDICINE
Payer: MEDICAID

## 2018-12-22 VITALS
OXYGEN SATURATION: 98 % | WEIGHT: 111.99 LBS | SYSTOLIC BLOOD PRESSURE: 109 MMHG | TEMPERATURE: 98.4 F | BODY MASS INDEX: 18.93 KG/M2 | HEART RATE: 78 BPM | RESPIRATION RATE: 16 BRPM | DIASTOLIC BLOOD PRESSURE: 74 MMHG

## 2018-12-22 DIAGNOSIS — R10.9 ABDOMINAL PAIN DURING PREGNANCY IN FIRST TRIMESTER: ICD-10-CM

## 2018-12-22 DIAGNOSIS — F41.1 ANXIETY STATE: Primary | ICD-10-CM

## 2018-12-22 DIAGNOSIS — O26.891 ABDOMINAL PAIN DURING PREGNANCY IN FIRST TRIMESTER: ICD-10-CM

## 2018-12-22 DIAGNOSIS — Z3A.09 9 WEEKS GESTATION OF PREGNANCY: ICD-10-CM

## 2018-12-22 DIAGNOSIS — O20.9 VAGINAL BLEEDING IN PREGNANCY, FIRST TRIMESTER: ICD-10-CM

## 2018-12-22 LAB
A/G RATIO: 1.3 (ref 1.1–2.2)
ABO/RH: NORMAL
ALBUMIN SERPL-MCNC: 3.7 G/DL (ref 3.4–5)
ALP BLD-CCNC: 37 U/L (ref 40–129)
ALT SERPL-CCNC: 14 U/L (ref 10–40)
ANION GAP SERPL CALCULATED.3IONS-SCNC: 15 MMOL/L (ref 3–16)
AST SERPL-CCNC: 14 U/L (ref 15–37)
BASOPHILS ABSOLUTE: 0 K/UL (ref 0–0.2)
BASOPHILS RELATIVE PERCENT: 0.4 %
BILIRUB SERPL-MCNC: 0.4 MG/DL (ref 0–1)
BUN BLDV-MCNC: 12 MG/DL (ref 7–20)
CALCIUM SERPL-MCNC: 8.7 MG/DL (ref 8.3–10.6)
CHLORIDE BLD-SCNC: 100 MMOL/L (ref 99–110)
CO2: 18 MMOL/L (ref 21–32)
CREAT SERPL-MCNC: <0.5 MG/DL (ref 0.6–1.1)
EOSINOPHILS ABSOLUTE: 0 K/UL (ref 0–0.6)
EOSINOPHILS RELATIVE PERCENT: 0.3 %
GFR AFRICAN AMERICAN: >60
GFR NON-AFRICAN AMERICAN: >60
GLOBULIN: 2.9 G/DL
GLUCOSE BLD-MCNC: 83 MG/DL (ref 70–99)
GONADOTROPIN, CHORIONIC (HCG) QUANT: NORMAL MIU/ML
HCT VFR BLD CALC: 34.4 % (ref 36–48)
HEMOGLOBIN: 12 G/DL (ref 12–16)
LYMPHOCYTES ABSOLUTE: 1.5 K/UL (ref 1–5.1)
LYMPHOCYTES RELATIVE PERCENT: 14.3 %
MAGNESIUM: 1.8 MG/DL (ref 1.8–2.4)
MCH RBC QN AUTO: 32.3 PG (ref 26–34)
MCHC RBC AUTO-ENTMCNC: 34.8 G/DL (ref 31–36)
MCV RBC AUTO: 92.6 FL (ref 80–100)
MONOCYTES ABSOLUTE: 0.9 K/UL (ref 0–1.3)
MONOCYTES RELATIVE PERCENT: 8 %
NEUTROPHILS ABSOLUTE: 8.3 K/UL (ref 1.7–7.7)
NEUTROPHILS RELATIVE PERCENT: 77 %
PDW BLD-RTO: 12.3 % (ref 12.4–15.4)
PLATELET # BLD: 216 K/UL (ref 135–450)
PMV BLD AUTO: 7.8 FL (ref 5–10.5)
POTASSIUM REFLEX MAGNESIUM: 3.5 MMOL/L (ref 3.5–5.1)
RBC # BLD: 3.72 M/UL (ref 4–5.2)
SODIUM BLD-SCNC: 133 MMOL/L (ref 136–145)
TOTAL PROTEIN: 6.6 G/DL (ref 6.4–8.2)
WBC # BLD: 10.7 K/UL (ref 4–11)

## 2018-12-22 PROCEDURE — 86901 BLOOD TYPING SEROLOGIC RH(D): CPT

## 2018-12-22 PROCEDURE — 99284 EMERGENCY DEPT VISIT MOD MDM: CPT

## 2018-12-22 PROCEDURE — 85025 COMPLETE CBC W/AUTO DIFF WBC: CPT

## 2018-12-22 PROCEDURE — 80053 COMPREHEN METABOLIC PANEL: CPT

## 2018-12-22 PROCEDURE — 76801 OB US < 14 WKS SINGLE FETUS: CPT

## 2018-12-22 PROCEDURE — 84702 CHORIONIC GONADOTROPIN TEST: CPT

## 2018-12-22 PROCEDURE — 86900 BLOOD TYPING SEROLOGIC ABO: CPT

## 2018-12-22 PROCEDURE — 83735 ASSAY OF MAGNESIUM: CPT

## 2018-12-22 ASSESSMENT — PAIN DESCRIPTION - PAIN TYPE: TYPE: ACUTE PAIN

## 2018-12-22 ASSESSMENT — PAIN DESCRIPTION - LOCATION: LOCATION: ABDOMEN

## 2018-12-22 ASSESSMENT — PAIN SCALES - GENERAL: PAINLEVEL_OUTOF10: 3

## 2018-12-23 NOTE — ED NOTES
Pt to [address]. Electronically signed by Jessica Torres RN on 12/22/2018 at 8:24 PM       Jessica Torres RN  12/22/18 2024

## 2018-12-23 NOTE — ED PROVIDER NOTES
77.0 %    Lymphocytes % 14.3 %    Monocytes % 8.0 %    Eosinophils % 0.3 %    Basophils % 0.4 %    Neutrophils # 8.3 (H) 1.7 - 7.7 K/uL    Lymphocytes # 1.5 1.0 - 5.1 K/uL    Monocytes # 0.9 0.0 - 1.3 K/uL    Eosinophils # 0.0 0.0 - 0.6 K/uL    Basophils # 0.0 0.0 - 0.2 K/uL   Comprehensive Metabolic Panel w/ Reflex to MG   Result Value Ref Range    Sodium 133 (L) 136 - 145 mmol/L    Potassium reflex Magnesium 3.5 3.5 - 5.1 mmol/L    Chloride 100 99 - 110 mmol/L    CO2 18 (L) 21 - 32 mmol/L    Anion Gap 15 3 - 16    Glucose 83 70 - 99 mg/dL    BUN 12 7 - 20 mg/dL    CREATININE <0.5 (L) 0.6 - 1.1 mg/dL    GFR Non-African American >60 >60    GFR African American >60 >60    Calcium 8.7 8.3 - 10.6 mg/dL    Total Protein 6.6 6.4 - 8.2 g/dL    Alb 3.7 3.4 - 5.0 g/dL    Albumin/Globulin Ratio 1.3 1.1 - 2.2    Total Bilirubin 0.4 0.0 - 1.0 mg/dL    Alkaline Phosphatase 37 (L) 40 - 129 U/L    ALT 14 10 - 40 U/L    AST 14 (L) 15 - 37 U/L    Globulin 2.9 g/dL   HCG, Quantitative, Pregnancy   Result Value Ref Range    hCG Quant 037230.0 <5.0 mIU/mL   Magnesium   Result Value Ref Range    Magnesium 1.80 1.80 - 2.40 mg/dL   ABO/RH   Result Value Ref Range    ABO/Rh O POS      Imaging: All Radiology results interpreted by Radiologist unless otherwise noted. US OB LESS THAN 14 WEEKS SINGLE OR FIRST GESTATION   Final Result   Single intra pregnancy measuring 9 weeks 1 day. Small amount of subchorionic hemorrhage. ED Course / Medical Decision Making   Medications - No data to display     MDM:   Afebrile, stable, patient presents to the ED for evaluation. Seen in conjunction with attending ED provider who agrees with assessment and plan. Dr Andry Mosley  Patients PO2 is 98% on room air they are not hypoxic, patient has had pregnancy confirmed the intrauterine with past abdominal ultrasound. Patient has been provided in the past with pregnancy related resources on numerous occasions.   Patient has not followed up with these referrals. Labs evaluated reveal mild hyponatremia with a sodium of 133 CO2 of 18. Otherwise without clinical relevance. Ultrasound today shows intrauterine pregnancy measuring 9 weeks 1 day. Small amount of subchorionic hemorrhage. Patient is again provided with follow-up with ObGyn encouraged to follow-up with ObGyn for the health of her baby encouraged to take prenatal vitamins and avoid high risk behaviors, patient is advised that anxiety medications are not safe in pregnancy encouraged to follow-up with cognitive behavioral therapy and resources patient denies any suicidal ideations or suicidal plan. No homicidal ideations homicidal.  Patient denies any auditory or visual hallucinations. There is no indication for admission at this time. Patient will be discharged in stable condition. All questions are answered. Indications for return to the ED are discussed. Patient is advised if any new or worsening symptoms arise they should immediately return to the emergency room. Follow-up with primary care in 1-2 days. Counseling: The emergency provider has spoken with the patient and discussed todays results, in addition to providing specific details for the plan of care and counseling regarding the diagnosis and prognosis. Questions are answered at this time and they are agreeable with the plan. Assessment      1. Anxiety state    2. Abdominal pain during pregnancy in first trimester    3. Vaginal bleeding in pregnancy, first trimester    4. 9 weeks gestation of pregnancy      Plan   Discharge to home  Patient condition is stable    New Medications     Discharge Medication List as of 12/22/2018 10:07 PM        Electronically signed by Toby Moya PA-C   DD: 12/24/18  **This report was transcribed using voice recognition software. Every effort was made to ensure accuracy; however, inadvertent computerized transcription errors may be present.   END OF ED PROVIDER NOTE    Lab / Imaging Results (All laboratory and radiology results have been personally reviewed by myself)  Labs:  Results for orders placed or performed during the hospital encounter of 12/22/18   CBC Auto Differential   Result Value Ref Range    WBC 10.7 4.0 - 11.0 K/uL    RBC 3.72 (L) 4.00 - 5.20 M/uL    Hemoglobin 12.0 12.0 - 16.0 g/dL    Hematocrit 34.4 (L) 36.0 - 48.0 %    MCV 92.6 80.0 - 100.0 fL    MCH 32.3 26.0 - 34.0 pg    MCHC 34.8 31.0 - 36.0 g/dL    RDW 12.3 (L) 12.4 - 15.4 %    Platelets 051 178 - 361 K/uL    MPV 7.8 5.0 - 10.5 fL    Neutrophils % 77.0 %    Lymphocytes % 14.3 %    Monocytes % 8.0 %    Eosinophils % 0.3 %    Basophils % 0.4 %    Neutrophils # 8.3 (H) 1.7 - 7.7 K/uL    Lymphocytes # 1.5 1.0 - 5.1 K/uL    Monocytes # 0.9 0.0 - 1.3 K/uL    Eosinophils # 0.0 0.0 - 0.6 K/uL    Basophils # 0.0 0.0 - 0.2 K/uL   Comprehensive Metabolic Panel w/ Reflex to MG   Result Value Ref Range    Sodium 133 (L) 136 - 145 mmol/L    Potassium reflex Magnesium 3.5 3.5 - 5.1 mmol/L    Chloride 100 99 - 110 mmol/L    CO2 18 (L) 21 - 32 mmol/L    Anion Gap 15 3 - 16    Glucose 83 70 - 99 mg/dL    BUN 12 7 - 20 mg/dL    CREATININE <0.5 (L) 0.6 - 1.1 mg/dL    GFR Non-African American >60 >60    GFR African American >60 >60    Calcium 8.7 8.3 - 10.6 mg/dL    Total Protein 6.6 6.4 - 8.2 g/dL    Alb 3.7 3.4 - 5.0 g/dL    Albumin/Globulin Ratio 1.3 1.1 - 2.2    Total Bilirubin 0.4 0.0 - 1.0 mg/dL    Alkaline Phosphatase 37 (L) 40 - 129 U/L    ALT 14 10 - 40 U/L    AST 14 (L) 15 - 37 U/L    Globulin 2.9 g/dL   HCG, Quantitative, Pregnancy   Result Value Ref Range    hCG Quant 767068.0 <5.0 mIU/mL   Magnesium   Result Value Ref Range    Magnesium 1.80 1.80 - 2.40 mg/dL   ABO/RH   Result Value Ref Range    ABO/Rh O POS      Imaging: All Radiology results interpreted by Radiologist unless otherwise noted. US OB LESS THAN 14 WEEKS SINGLE OR FIRST GESTATION   Final Result   Single intra pregnancy measuring 9 weeks 1 day.       Small amount of

## 2018-12-23 NOTE — ED NOTES
Bed: B-04  Expected date:   Expected time:   Means of arrival:   Comments:  [facility] [address], RN  12/22/18 1930

## 2018-12-27 ENCOUNTER — HOSPITAL ENCOUNTER (OUTPATIENT)
Dept: OBGYN CLINIC | Age: 23
Discharge: HOME | End: 2018-12-27
Payer: MEDICAID

## 2018-12-27 VITALS
DIASTOLIC BLOOD PRESSURE: 75 MMHG | WEIGHT: 101.13 LBS | SYSTOLIC BLOOD PRESSURE: 116 MMHG | BODY MASS INDEX: 17.09 KG/M2 | HEART RATE: 80 BPM

## 2018-12-27 DIAGNOSIS — Z3A.09 9 WEEKS GESTATION OF PREGNANCY: Primary | ICD-10-CM

## 2018-12-27 LAB
ABO/RH: NORMAL
AMPHETAMINE SCREEN, URINE: ABNORMAL
ANTIBODY SCREEN: NORMAL
BARBITURATE SCREEN URINE: ABNORMAL
BASOPHILS ABSOLUTE: 0 K/UL (ref 0–0.2)
BASOPHILS RELATIVE PERCENT: 0.2 %
BENZODIAZEPINE SCREEN, URINE: ABNORMAL
BILIRUBIN URINE: NEGATIVE MG/DL
BLOOD, URINE: ABNORMAL
BUPRENORPHINE URINE: ABNORMAL
CANNABINOID SCREEN URINE: POSITIVE
CLARITY: CLEAR
COCAINE METABOLITE SCREEN URINE: ABNORMAL
COLOR: YELLOW
EOSINOPHILS ABSOLUTE: 0.1 K/UL (ref 0–0.6)
EOSINOPHILS RELATIVE PERCENT: 0.7 %
GLUCOSE URINE: NEGATIVE MG/DL
HCT VFR BLD CALC: 37.5 % (ref 36–48)
HEMOGLOBIN: 13.1 G/DL (ref 12–16)
HEPATITIS B SURFACE ANTIGEN INTERPRETATION: ABNORMAL
HIV AG/AB: NORMAL
HIV ANTIGEN: NORMAL
HIV-1 ANTIBODY: NORMAL
HIV-2 AB: NORMAL
KETONES, URINE: ABNORMAL MG/DL
LEUKOCYTE ESTERASE, URINE: NEGATIVE
LYMPHOCYTES ABSOLUTE: 1.6 K/UL (ref 1–5.1)
LYMPHOCYTES RELATIVE PERCENT: 19.6 %
Lab: ABNORMAL
MCH RBC QN AUTO: 33 PG (ref 26–34)
MCHC RBC AUTO-ENTMCNC: 34.9 G/DL (ref 31–36)
MCV RBC AUTO: 94.4 FL (ref 80–100)
METHADONE SCREEN, URINE: ABNORMAL
MONOCYTES ABSOLUTE: 0.9 K/UL (ref 0–1.3)
MONOCYTES RELATIVE PERCENT: 10.8 %
NEUTROPHILS ABSOLUTE: 5.4 K/UL (ref 1.7–7.7)
NEUTROPHILS RELATIVE PERCENT: 68.7 %
NITRITE, URINE: NEGATIVE
OPIATE SCREEN URINE: ABNORMAL
OXYCODONE URINE: ABNORMAL
PDW BLD-RTO: 12.4 % (ref 12.4–15.4)
PH UA: 6
PH UA: 7
PHENCYCLIDINE SCREEN URINE: ABNORMAL
PLATELET # BLD: 262 K/UL (ref 135–450)
PMV BLD AUTO: 7.6 FL (ref 5–10.5)
PROPOXYPHENE SCREEN: ABNORMAL
PROTEIN UA: NEGATIVE MG/DL
RBC # BLD: 3.98 M/UL (ref 4–5.2)
RUBELLA ANTIBODY IGG: 349.2 IU/ML
SPECIFIC GRAVITY UA: 1.02
TOTAL SYPHILLIS IGG/IGM: ABNORMAL
UROBILINOGEN, URINE: 0.2 E.U./DL
WBC # BLD: 7.9 K/UL (ref 4–11)

## 2018-12-27 PROCEDURE — 86900 BLOOD TYPING SEROLOGIC ABO: CPT

## 2018-12-27 PROCEDURE — 86702 HIV-2 ANTIBODY: CPT

## 2018-12-27 PROCEDURE — 99204 OFFICE O/P NEW MOD 45 MIN: CPT | Performed by: OBSTETRICS & GYNECOLOGY

## 2018-12-27 PROCEDURE — 81003 URINALYSIS AUTO W/O SCOPE: CPT

## 2018-12-27 PROCEDURE — 85025 COMPLETE CBC W/AUTO DIFF WBC: CPT

## 2018-12-27 PROCEDURE — 99213 OFFICE O/P EST LOW 20 MIN: CPT

## 2018-12-27 PROCEDURE — 86850 RBC ANTIBODY SCREEN: CPT

## 2018-12-27 PROCEDURE — 83020 HEMOGLOBIN ELECTROPHORESIS: CPT

## 2018-12-27 PROCEDURE — 86701 HIV-1ANTIBODY: CPT

## 2018-12-27 PROCEDURE — 86780 TREPONEMA PALLIDUM: CPT

## 2018-12-27 PROCEDURE — 87491 CHLMYD TRACH DNA AMP PROBE: CPT

## 2018-12-27 PROCEDURE — 87624 HPV HI-RISK TYP POOLED RSLT: CPT

## 2018-12-27 PROCEDURE — 80307 DRUG TEST PRSMV CHEM ANLYZR: CPT

## 2018-12-27 PROCEDURE — 88175 CYTOPATH C/V AUTO FLUID REDO: CPT

## 2018-12-27 PROCEDURE — 86762 RUBELLA ANTIBODY: CPT

## 2018-12-27 PROCEDURE — 87591 N.GONORRHOEAE DNA AMP PROB: CPT

## 2018-12-27 PROCEDURE — 36415 COLL VENOUS BLD VENIPUNCTURE: CPT

## 2018-12-27 PROCEDURE — 86901 BLOOD TYPING SEROLOGIC RH(D): CPT

## 2018-12-27 PROCEDURE — 87390 HIV-1 AG IA: CPT

## 2018-12-27 PROCEDURE — 87086 URINE CULTURE/COLONY COUNT: CPT

## 2018-12-27 PROCEDURE — 87340 HEPATITIS B SURFACE AG IA: CPT

## 2018-12-27 RX ORDER — PNV NO.95/FERROUS FUM/FOLIC AC 28MG-0.8MG
1 TABLET ORAL DAILY
Qty: 30 TABLET | Refills: 5 | Status: SHIPPED | OUTPATIENT
Start: 2018-12-27 | End: 2019-05-21

## 2018-12-27 RX ORDER — LANOLIN ALCOHOL/MO/W.PET/CERES
50 CREAM (GRAM) TOPICAL DAILY
Qty: 30 TABLET | Refills: 3 | Status: SHIPPED | OUTPATIENT
Start: 2018-12-27 | End: 2019-02-22 | Stop reason: ALTCHOICE

## 2018-12-27 NOTE — PLAN OF CARE
Intermediate The teaching will focus on visit schedule and laboratory tests, plus additional topics such as health and lifestyle (e.g. kick counts, diet). This education can be found in the Discharge Instructions []   2   Complex New patient information packet given to the patient/caregiver and reviewed with the Registered Nurse. [x]   3       Patient Discharge and Planning  Planning Definition Points   General Follow-up with routine assessment and planning. Discharge instructions and After Visit Summary given to patient/caregiver and reviewed with the Registered Nurse. Simple follow-up with routine assessment and planning.    []   1   Intermediate Follow-up with routine assessment and planning. Discharge instructions and After Visit Summary given to patient/caregiver and reviewed with the Registered Nurse. Contact with additional resources (e.g. , Physician, Lactation). May include filling out forms, writing letters, communication with insurance , FLMA forms, etc.   [x]   2   Complex Full, comprehensive assessment and planning which includes assistance for a hospital admission or transfer to a higher level of care facility.   []   3     Is this the Patient's First Visit to the Prenatal Clinic    Yes     Is this Patient Established @ this [facility] AT [facility]             Clinical Level of Care      Points  0-3  Level 1 []     Points  4-6  Level 2 []     Points  7-8  Level 3 [x]     Points  9-10  Level 4 []     Points  11-12  Level 5 []       Electronically signed by Edwin Gilmore RN on 12/27/2018 at 10:42 AM

## 2018-12-27 NOTE — CARE COORDINATION
Therapist met with pt this is her 1st visit. She stated she has had issues with Depression for some time and deals with it as it comes. Pt stated she has never seen a Therapist before but was seen a  hospital last year for suicidal Ideation. Pt stated that her mother has never really been close with her and seems to favor her brothers more. She is the only girl in the family. Pt stated that the baby's father broke up with her recently and although she is sad about it she understands her focus needs to be on herself and the baby. Therapist gave pt some information on symptoms of depression as well as coping skills. We talked about the relationship and what a healthy relationship looks like. Therapist also gave pt all the information regarding the [facility]. Pt stated that she was interested in attending. Therapist administered PHQ-9 and she scored a 13. Therapist will follow pt and will see her at next appointment.     REANNA Mendez

## 2018-12-27 NOTE — PROGRESS NOTES
Problem List Items Addressed This Visit     None      Visit Diagnoses     9 weeks gestation of pregnancy    -  Primary    Relevant Orders    Hemoglobinopathy Eval (Electrophoresis)    Obstetric panel    HIV Screen    Type and Screen                  Prenatal [address], [address] [facility] 24    First OB visit Progress Note      Subjective:     Chief Complaint: To establish prenatal care       HISTORY of PRESENT ILLNESS (HPI)     History of  Husam Rich is a 26 y.o.,  at 9w4d who presents to the clinic for her first OB visit. Sharon Rea  denies any complaints at this time. She has been to the ED multiple times early in this pregnancy for cramping, vaginal bleeding, and nausea. She continues to have nausea daily with vomiting once a day. She states that her mom does not want her taking antiemetics and therefore she is not. Her vaginal bleeding stopped within the last week. Her CATARINO is 19 based on her LMP, which is consistent with a 9-wk US performed through the ED on 18. She does not complain of vaginal bleeding. She does not report leakage of fluid. She  does not complain of contractions. She  does not complain of decreased fetal movement. PAST MEDICAL HISTORY        Diagnosis Date    ADHD     Asthma     Depression     Headache     Hip fracture (HCC)     right hip stress fx 2016    Varicella         PAST SURGICAL HISTORY    No past surgical history on file.     FAMILY HISTORY    Family History   Problem Relation Age of Onset    Mental Illness Mother     Diabetes Father     Asthma Brother     Other Brother     Breast Cancer Maternal Grandmother        SOCIAL HISTORY    Social History   Substance Use Topics    Smoking status: Former Smoker     Packs/day: 1.00     Start date: 2018     Quit date: 2018    Smokeless tobacco: Never Used    Alcohol use No      Comment: Prior to pregnancy       OB

## 2018-12-29 LAB — URINE CULTURE, ROUTINE: NORMAL

## 2019-01-01 LAB — HEMOGLOBIN ELECTROPHORESIS: NORMAL

## 2019-01-02 LAB
C. TRACHOMATIS DNA,THIN PREP: POSITIVE
HGB ELECTROPHORESIS INTERP: NORMAL
N. GONORRHOEAE DNA, THIN PREP: NEGATIVE

## 2019-01-03 LAB
HPV COMMENT: ABNORMAL
HPV TYPE 16: NOT DETECTED
HPV TYPE 18: NOT DETECTED
HPVOH (OTHER TYPES): DETECTED

## 2019-01-21 ENCOUNTER — TELEPHONE (OUTPATIENT)
Dept: OBGYN CLINIC | Age: 24
End: 2019-01-21

## 2019-01-21 DIAGNOSIS — Z3A.09 9 WEEKS GESTATION OF PREGNANCY: ICD-10-CM

## 2019-01-21 PROBLEM — R87.610 ASCUS OF CERVIX WITH NEGATIVE HIGH RISK HPV: Status: ACTIVE | Noted: 2019-01-21

## 2019-01-21 PROBLEM — O98.819 CHLAMYDIA INFECTION AFFECTING PREGNANCY: Status: ACTIVE | Noted: 2019-01-21

## 2019-01-21 PROBLEM — A74.9 CHLAMYDIA INFECTION AFFECTING PREGNANCY: Status: ACTIVE | Noted: 2019-01-21

## 2019-01-24 ENCOUNTER — APPOINTMENT (OUTPATIENT)
Dept: OBGYN CLINIC | Age: 24
End: 2019-01-24
Payer: MEDICAID

## 2019-01-24 ENCOUNTER — HOSPITAL ENCOUNTER (OUTPATIENT)
Dept: OBGYN CLINIC | Age: 24
Discharge: HOME | End: 2019-01-24
Payer: MEDICAID

## 2019-01-24 VITALS
WEIGHT: 104.6 LBS | HEART RATE: 85 BPM | SYSTOLIC BLOOD PRESSURE: 108 MMHG | BODY MASS INDEX: 17.68 KG/M2 | DIASTOLIC BLOOD PRESSURE: 56 MMHG

## 2019-01-24 PROCEDURE — 99212 OFFICE O/P EST SF 10 MIN: CPT | Performed by: OBSTETRICS & GYNECOLOGY

## 2019-01-24 PROCEDURE — 81003 URINALYSIS AUTO W/O SCOPE: CPT

## 2019-01-24 PROCEDURE — 99211 OFF/OP EST MAY X REQ PHY/QHP: CPT

## 2019-01-25 LAB
BILIRUBIN URINE: NEGATIVE MG/DL
BLOOD, URINE: ABNORMAL
CLARITY: CLEAR
COLOR: YELLOW
GLUCOSE URINE: NEGATIVE MG/DL
KETONES, URINE: NEGATIVE MG/DL
LEUKOCYTE ESTERASE, URINE: NEGATIVE
NITRITE, URINE: NEGATIVE
PH UA: 7
PROTEIN UA: NEGATIVE MG/DL
SPECIFIC GRAVITY UA: 1.02
UROBILINOGEN, URINE: 0.2 E.U./DL

## 2019-02-22 ENCOUNTER — HOSPITAL ENCOUNTER (OUTPATIENT)
Dept: OBGYN CLINIC | Age: 24
Discharge: HOME | End: 2019-02-22
Payer: MEDICAID

## 2019-02-22 VITALS
SYSTOLIC BLOOD PRESSURE: 102 MMHG | BODY MASS INDEX: 18.96 KG/M2 | WEIGHT: 112.2 LBS | DIASTOLIC BLOOD PRESSURE: 69 MMHG | HEART RATE: 81 BPM

## 2019-02-22 DIAGNOSIS — Z3A.09 9 WEEKS GESTATION OF PREGNANCY: Primary | ICD-10-CM

## 2019-02-22 DIAGNOSIS — Z3A.09 9 WEEKS GESTATION OF PREGNANCY: ICD-10-CM

## 2019-02-22 PROCEDURE — 99212 OFFICE O/P EST SF 10 MIN: CPT | Performed by: ADVANCED PRACTICE MIDWIFE

## 2019-02-22 PROCEDURE — 87591 N.GONORRHOEAE DNA AMP PROB: CPT

## 2019-02-22 PROCEDURE — 99212 OFFICE O/P EST SF 10 MIN: CPT

## 2019-02-22 PROCEDURE — 87491 CHLMYD TRACH DNA AMP PROBE: CPT

## 2019-02-25 LAB
C TRACH DNA GENITAL QL NAA+PROBE: NEGATIVE
N. GONORRHOEAE DNA: NEGATIVE

## 2019-02-28 ENCOUNTER — HOSPITAL ENCOUNTER (OUTPATIENT)
Dept: OBGYN CLINIC | Age: 24
Discharge: HOME | End: 2019-02-28

## 2019-03-22 ENCOUNTER — HOSPITAL ENCOUNTER (OUTPATIENT)
Dept: ULTRASOUND IMAGING | Age: 24
Discharge: HOME | End: 2019-03-22
Payer: MEDICAID

## 2019-03-22 ENCOUNTER — HOSPITAL ENCOUNTER (OUTPATIENT)
Dept: OBGYN CLINIC | Age: 24
Discharge: HOME | End: 2019-03-22
Payer: MEDICAID

## 2019-03-22 VITALS
SYSTOLIC BLOOD PRESSURE: 104 MMHG | WEIGHT: 117.6 LBS | HEART RATE: 83 BPM | BODY MASS INDEX: 19.87 KG/M2 | DIASTOLIC BLOOD PRESSURE: 68 MMHG

## 2019-03-22 DIAGNOSIS — Z3A.09 9 WEEKS GESTATION OF PREGNANCY: ICD-10-CM

## 2019-03-22 LAB
BILIRUBIN URINE: NEGATIVE MG/DL
BLOOD, URINE: NEGATIVE
CLARITY: CLEAR
COLOR: YELLOW
GLUCOSE URINE: NEGATIVE MG/DL
KETONES, URINE: ABNORMAL MG/DL
LEUKOCYTE ESTERASE, URINE: NEGATIVE
NITRITE, URINE: NEGATIVE
PH UA: 7 (ref 5–8)
PROTEIN UA: NEGATIVE MG/DL
SPECIFIC GRAVITY UA: 1.02 (ref 1–1.03)
UROBILINOGEN, URINE: 0.2 E.U./DL

## 2019-03-22 PROCEDURE — 81003 URINALYSIS AUTO W/O SCOPE: CPT

## 2019-03-22 PROCEDURE — 99212 OFFICE O/P EST SF 10 MIN: CPT | Performed by: ADVANCED PRACTICE MIDWIFE

## 2019-03-22 PROCEDURE — 76805 OB US >/= 14 WKS SNGL FETUS: CPT

## 2019-03-22 RX ORDER — ASCORBIC ACID, CHOLECALCIFEROL, .ALPHA.-TOCOPHEROL ACETATE, DL-, PYRIDOXINE HYDROCHLORIDE, FOLIC ACID, CYANOCOBALAMIN, BIOTIN, CALCIUM CARBONATE, FERROUS ASPARTO GLYCINATE, IRON, POTASSIUM IODIDE, MAGNESIUM OXIDE, DOCONEXENT AND LOWBUSH BLUEBERRY 60; 1000; 10; 26; 400; 13; 280; 80; 9; 9; 150; 25; 350; 25; 600 MG/1; [IU]/1; [IU]/1; MG/1; UG/1; UG/1; UG/1; MG/1; MG/1; MG/1; UG/1; MG/1; MG/1; MG/1; UG/1
1 CAPSULE, GELATIN COATED ORAL DAILY
Qty: 30 CAPSULE | Refills: 11 | Status: SHIPPED | OUTPATIENT
Start: 2019-03-22 | End: 2019-04-23

## 2019-03-22 RX ORDER — DOCUSATE SODIUM 100 MG/1
100 CAPSULE, LIQUID FILLED ORAL DAILY PRN
Qty: 30 CAPSULE | Refills: 11 | Status: ON HOLD | OUTPATIENT
Start: 2019-03-22 | End: 2019-07-22 | Stop reason: HOSPADM

## 2019-04-23 ENCOUNTER — HOSPITAL ENCOUNTER (OUTPATIENT)
Dept: OBGYN CLINIC | Age: 24
Discharge: HOME | End: 2019-04-23
Payer: MEDICAID

## 2019-04-23 VITALS
DIASTOLIC BLOOD PRESSURE: 68 MMHG | HEART RATE: 94 BPM | BODY MASS INDEX: 20.38 KG/M2 | WEIGHT: 120.6 LBS | SYSTOLIC BLOOD PRESSURE: 106 MMHG

## 2019-04-23 DIAGNOSIS — Z3A.09 9 WEEKS GESTATION OF PREGNANCY: Primary | ICD-10-CM

## 2019-04-23 LAB
ABO/RH: NORMAL
ANTIBODY SCREEN: NORMAL
BILIRUBIN URINE: NEGATIVE MG/DL
BLOOD, URINE: NEGATIVE
CLARITY: CLEAR
COLOR: YELLOW
GLUCOSE CHALLENGE: 103 MG/DL
GLUCOSE URINE: NEGATIVE MG/DL
KETONES, URINE: NEGATIVE MG/DL
LEUKOCYTE ESTERASE, URINE: ABNORMAL
NITRITE, URINE: NEGATIVE
PH UA: 7 (ref 5–8)
PROTEIN UA: NEGATIVE MG/DL
SPECIFIC GRAVITY UA: 1.02 (ref 1–1.03)
UROBILINOGEN, URINE: 0.2 E.U./DL

## 2019-04-23 PROCEDURE — 99213 OFFICE O/P EST LOW 20 MIN: CPT | Performed by: OBSTETRICS & GYNECOLOGY

## 2019-04-23 PROCEDURE — 81003 URINALYSIS AUTO W/O SCOPE: CPT

## 2019-04-23 PROCEDURE — 99212 OFFICE O/P EST SF 10 MIN: CPT

## 2019-04-23 PROCEDURE — 86901 BLOOD TYPING SEROLOGIC RH(D): CPT

## 2019-04-23 PROCEDURE — 86850 RBC ANTIBODY SCREEN: CPT

## 2019-04-23 PROCEDURE — 86900 BLOOD TYPING SEROLOGIC ABO: CPT

## 2019-04-23 PROCEDURE — 82950 GLUCOSE TEST: CPT

## 2019-04-23 PROCEDURE — 36415 COLL VENOUS BLD VENIPUNCTURE: CPT

## 2019-04-23 NOTE — PROGRESS NOTES
Pt here for routine prenatal visit. +FM reported daily. Denies any LOF/vaginal bleeding/ctx. Does C/O nausea/vomiting daily. Pharmacy informed pt her insurance did not cover the Prenate Mini's. Pt unable to keep original PNV down. RN instructed pt to try Flintstones chewables X2 tabs daily. Pt agreed. Glucose drink given per RN Navigator. Completed @ 1412. Pt instructed will have CBC, T&S, RPR , 1 hr GCT today.

## 2019-04-23 NOTE — CARE COORDINATION
Therapist met with pt and administered the PHQ-9. Pt stated that she's been sleeping a lot and thinking a lot. She stated that she was doing ok and for the most part she has support. She has been tempted to purchase clothes for the baby. Pt seemed more engaged since last visit. I will see pt next visit.       REANNA Mendez

## 2019-04-23 NOTE — PLAN OF CARE
Intermediate The teaching will focus on visit schedule and laboratory tests, plus additional topics such as health and lifestyle (e.g. kick counts, diet). This education can be found in the Discharge Instructions []   2   Complex New patient information packet given to the patient/caregiver and reviewed with the Registered Nurse.   []   3       Patient Discharge and Planning  Planning Definition Points   General Follow-up with routine assessment and planning. Discharge instructions and After Visit Summary given to patient/caregiver and reviewed with the Registered Nurse. Simple follow-up with routine assessment and planning.    []   1   Intermediate Follow-up with routine assessment and planning. Discharge instructions and After Visit Summary given to patient/caregiver and reviewed with the Registered Nurse. Contact with additional resources (e.g. , Physician, Lactation). May include filling out forms, writing letters, communication with insurance , FLMA forms, etc.   [x]   2   Complex Full, comprehensive assessment and planning which includes assistance for a hospital admission or transfer to a higher level of care facility.   []   3     Is this the Patient's First Visit to the Prenatal Clinic    No     Is this Patient Established @ this [facility] AT [place]  Yes             Clinical Level of Care      Points  0-3  Level 1 []     Points  4-6  Level 2 [x]     Points  7-8  Level 3 []     Points  9-10  Level 4 []     Points  11-12  Level 5 []       Electronically signed by Russ Hodge RN on 4/23/2019 at 2:26 PM

## 2019-04-23 NOTE — PROGRESS NOTES
Prenatal 801 [facility]  [address] [address], [address] [facility], [facility] 24      OB Follow-Up Visit Progress Note    Chief Complaint   Patient presents with    Routine Prenatal Visit        Subjective:     HISTORY OF PRESENT ILLNESS (HPI)    History of  Husam Vivar is a 27 y.o.,  at 26w2d who presents to the clinic for her follow-up OB visit. Marianna De La Rosa  denies any complaints at this time. She does not complain of vaginal bleeding. She does not report leakage of fluid. She  does not complain of contractions. She  does not complain of decreased fetal movement. PAST MEDICAL HISTORY        Diagnosis Date    ADHD     Asthma     Depression     Headache     Hip fracture (HCC)     right hip stress fx 2016    Varicella         PAST SURGICAL HISTORY    History reviewed. No pertinent surgical history. FAMILY HISTORY    Family History   Problem Relation Age of Onset    Mental Illness Mother     Diabetes Father     Asthma Brother     Other Brother     Breast Cancer Maternal Grandmother        SOCIAL HISTORY    Social History     Tobacco Use    Smoking status: Former Smoker     Packs/day: 1.00     Start date: 2018     Last attempt to quit: 2018     Years since quittin.4    Smokeless tobacco: Never Used   Substance Use Topics    Alcohol use: No     Comment: Prior to pregnancy    Drug use: No     Types: Marijuana     Comment: 1-2 times monthly - prior to pregnancy       OB HISTORY    OB History    Para Term  AB Living   1 0 0 0 0 0   SAB TAB Ectopic Molar Multiple Live Births   0 0 0 0 1 0      # Outcome Date GA Lbr Alexx/2nd Weight Sex Delivery Anes PTL Lv   1A             1B Current                ALLERGIES    No Known Allergies    MEDICATIONS    Prior to Admission medications    Medication Sig Start Date End Date Taking?  Authorizing Provider   docusate sodium (COLACE) 100 MG capsule Take 1 capsule by mouth daily as needed for Constipation 3/22/19  Yes Dee Dee Phan, APRN - CNM   Prenatal Vit-Fe Fumarate-FA (PRENATAL VITAMIN) 27-0.8 MG TABS Take 1 tablet by mouth daily 18  Yes Estefani Porter MD       REVIEW OF SYSTEMS    Pertinent items are noted in HPI. Objective:      /68   Pulse 94   Wt 120 lb 9.6 oz (54.7 kg)   LMP 10/21/2018 (Approximate)   BMI 20.38 kg/m²     Wt Readings from Last 2 Encounters:   19 120 lb 9.6 oz (54.7 kg)   19 117 lb 9.6 oz (53.3 kg)       See ACOG flowsheet above    Physical Exam:  A physical exam was not performed during this visit. Neurologic/Psychiatric: alert and oriented X3  Constitutional: alert and oriented to person, place and time, well-developed and well-nourished, in no acute distress  Gastrointestinal: Abdomen  is soft and  is non tender and gravid  Uterus:  Non tender. Pelvic Exam:  A pelvic exam was not completed at this visit. Assessment:     Patient Active Problem List   Diagnosis Code    26 yo G1 Z3A.09    Chlamydia infection affecting pregnancy O98.819, A74.9    ASCUS of cervix with negative high risk HPV R80.80       27 y.o.  26w2d    Obstetrician: Estefani Porter MD      Plan:     - Prenatal labs: Reviewed by the physician  - GCT today  - Patient to follow up in: 4 weeks    Kick count reinforced. Pregnancy expectations were discussed and all questions were answered. 75% of time was spent discussing findings, management, and treatment options.             Electronically signed by Estefani Porter MD on 2019 at 2:18 PM

## 2019-05-21 ENCOUNTER — HOSPITAL ENCOUNTER (OUTPATIENT)
Dept: OBGYN CLINIC | Age: 24
Discharge: HOME | End: 2019-05-21
Payer: MEDICAID

## 2019-05-21 ENCOUNTER — HOSPITAL ENCOUNTER (OUTPATIENT)
Age: 24
Discharge: HOME | End: 2019-05-21
Payer: MEDICAID

## 2019-05-21 VITALS
HEART RATE: 92 BPM | DIASTOLIC BLOOD PRESSURE: 73 MMHG | SYSTOLIC BLOOD PRESSURE: 114 MMHG | BODY MASS INDEX: 20.49 KG/M2 | WEIGHT: 121.25 LBS

## 2019-05-21 DIAGNOSIS — Z3A.09 9 WEEKS GESTATION OF PREGNANCY: ICD-10-CM

## 2019-05-21 LAB
BILIRUBIN URINE: NEGATIVE MG/DL
BLOOD, URINE: NEGATIVE
CLARITY: CLEAR
COLOR: YELLOW
GLUCOSE URINE: NEGATIVE MG/DL
HCT VFR BLD CALC: 30.9 % (ref 36–48)
HEMOGLOBIN: 10.9 G/DL (ref 12–16)
KETONES, URINE: 15 MG/DL
LEUKOCYTE ESTERASE, URINE: NEGATIVE
MCH RBC QN AUTO: 33.4 PG (ref 26–34)
MCHC RBC AUTO-ENTMCNC: 35.2 G/DL (ref 31–36)
MCV RBC AUTO: 94.9 FL (ref 80–100)
NITRITE, URINE: NEGATIVE
PDW BLD-RTO: 12.2 % (ref 12.4–15.4)
PH UA: 6.5 (ref 5–8)
PLATELET # BLD: 203 K/UL (ref 135–450)
PMV BLD AUTO: 9.1 FL (ref 5–10.5)
PROTEIN UA: NEGATIVE MG/DL
RBC # BLD: 3.25 M/UL (ref 4–5.2)
SPECIFIC GRAVITY UA: 1.02 (ref 1–1.03)
UROBILINOGEN, URINE: 0.2 E.U./DL
WBC # BLD: 8.6 K/UL (ref 4–11)

## 2019-05-21 PROCEDURE — 36415 COLL VENOUS BLD VENIPUNCTURE: CPT

## 2019-05-21 PROCEDURE — 99212 OFFICE O/P EST SF 10 MIN: CPT

## 2019-05-21 PROCEDURE — 99213 OFFICE O/P EST LOW 20 MIN: CPT | Performed by: OBSTETRICS & GYNECOLOGY

## 2019-05-21 PROCEDURE — 86780 TREPONEMA PALLIDUM: CPT

## 2019-05-21 PROCEDURE — 85027 COMPLETE CBC AUTOMATED: CPT

## 2019-05-21 PROCEDURE — 76817 TRANSVAGINAL US OBSTETRIC: CPT

## 2019-05-21 PROCEDURE — 81003 URINALYSIS AUTO W/O SCOPE: CPT

## 2019-05-21 RX ORDER — CALCIUM CARBONATE 300MG(750)
2 TABLET,CHEWABLE ORAL DAILY
COMMUNITY
End: 2019-11-11 | Stop reason: ALTCHOICE

## 2019-05-21 NOTE — CARE COORDINATION
Therapist met with pt she stated she has been doing a lot better. Therapist administered the PHQ-9. Pt scored a 3 . Pt stated at times she does feel down and depressed but it has been situations that have triggered this feeling. Pt has tools to assist her through these times and also a support system in place. Therapist gave pt some material to read regarding depression and triggers. Therapist will see pt at next visit.       Bartholome Seip, LISW-S

## 2019-05-21 NOTE — PROGRESS NOTES
Prenatal 801 [facility]  [address], [address] [facility], [facility] 24      OB Follow-Up Visit Progress Note    Chief Complaint   Patient presents with    Routine Prenatal Visit        Subjective:     HISTORY OF PRESENT ILLNESS (HPI)    History of  Anibal Jimenez is a 27 y.o.,  at 27w4d who presents to the clinic for her follow-up OB visit. Sheba Castillo  denies any complaints at this time. She does not complain of vaginal bleeding. She does not report leakage of fluid. She  does not complain of contractions. She  does not complain of decreased fetal movement. PAST MEDICAL HISTORY        Diagnosis Date    ADHD     Asthma     Depression     Headache     Hip fracture (HCC)     right hip stress fx 2016    Varicella         PAST SURGICAL HISTORY    History reviewed. No pertinent surgical history. FAMILY HISTORY    Family History   Problem Relation Age of Onset    Mental Illness Mother     Diabetes Father     Asthma Brother     Other Brother     Breast Cancer Maternal Grandmother        SOCIAL HISTORY    Social History     Tobacco Use    Smoking status: Former Smoker     Packs/day: 1.00     Start date: 2018     Last attempt to quit: 2018     Years since quittin.4    Smokeless tobacco: Never Used   Substance Use Topics    Alcohol use: No     Comment: Prior to pregnancy    Drug use: No     Types: Marijuana     Comment: 1-2 times monthly - prior to pregnancy       ALLERGIES    No Known Allergies    MEDICATIONS    Current Outpatient Medications on File Prior to Encounter   Medication Sig Dispense Refill    Prenatal MV-Min-FA-Omega-3 (PRENATAL GUMMIES/DHA & FA) 0.4-32.5 MG CHEW Take 2 tablets by mouth daily      docusate sodium (COLACE) 100 MG capsule Take 1 capsule by mouth daily as needed for Constipation 30 capsule 11     No current facility-administered medications on file prior to encounter.         REVIEW OF SYSTEMS    Pertinent items are noted in HPI. Objective:      /73   Pulse 92   Wt 121 lb 4 oz (55 kg)   LMP 10/21/2018 (Approximate)   BMI 20.49 kg/m²     Wt Readings from Last 2 Encounters:   19 121 lb 4 oz (55 kg)   19 120 lb 9.6 oz (54.7 kg)       See ACOG flowsheet above    Physical Exam:  A physical exam was not performed during this visit. Pelvic Exam:  A pelvic exam was not completed during this visit. Assessment:     Patient Active Problem List   Diagnosis Code    24 yo G1 Z3A.09    Chlamydia infection affecting pregnancy O98.819, A74.9    ASCUS of cervix with negative high risk HPV R80.80       27 y.o.  30w2d    Obstetrician: Leanne Garnica MD      Plan:     - Prenatal labs: Reviewed by the physician  - Patient to follow up in: 2 weeks  Discussed TdaP recommendations, epidemic in [place] area and risks to . Pt declines today. Kick count reinforced. Pregnancy expectations were discussed and all questions were answered.             Electronically signed by Leanne Garnica MD on 2019 at 1:55 PM

## 2019-05-21 NOTE — PROGRESS NOTES
Pt reports +FM day/night. Not taking stool softener, as not having problem with constipation. Denies LOF/vaginal bleeding/cramping. Pt declines TDap today.

## 2019-05-21 NOTE — PLAN OF CARE
Prenatal Clinic  Clinical Level of [address].    NAME: Praveen Cruz  YOB: 1995 GENDER: female  MEDICAL RECORD NUMBER:  [id number]  DATE:  5/21/2019    Assessment   Points   No Assessment []   0   General Prenatal assessment (e.g. weight, vital signs, urine test). Completed OB Clinic navigator tabs, in partnership with the Registered Nurse. []   1   General Prenatal assessment (e.g. weight, vital signs, urine test). Completed OB Clinic navigator tabs, in partnership with the Registered Nurse. Set up tests or procedures (e.g. ultrasound, specimen swabs, induction). [x]   2   Full Prenatal assessment (e.g. weight, vitals signs, urine test) to include a full review of history. Completed OB Clinic navigator tabs, in partnership with the Registered Nurse. Set up tests or procedures (e.g. ultrasound, specimen swabs, induction). Or transfer to an outside facility, transfer to [facility] for further evaluation, admission to the hospital.  []   3       Ambulation Status   Status Definition Points   Independent Independently able to ambulate. Fully able (without any assistance) to get on/off exam table/chair. [x]   0   Minimal Physical Assistance Requires physical assistance of one person to ambulate and/or position patient to be examined. Includes necessary physical assistance to position lower extremities on/off stool. []   1   Moderate Physical Assistance Requires at least one staff member to physically assist patient in ambulating into treatment room, and on/off recliner chair. []   2   Full Assistance Requires assistance of at least two staff members to transfer patient into treatment room and/or on/off exam table/chair. \"Total Transfer\". []   3       Teaching Effort   Effort Definition Points   No Teaching  []   0   General The teaching will focus on visit schedule and laboratory tests. This education can be found in the Discharge Instructions.  [x]   1 1

## 2019-05-22 LAB — TOTAL SYPHILLIS IGG/IGM: NORMAL

## 2019-06-07 ENCOUNTER — HOSPITAL ENCOUNTER (OUTPATIENT)
Dept: OBGYN CLINIC | Age: 24
Discharge: HOME | End: 2019-06-07
Payer: MEDICAID

## 2019-06-07 VITALS
HEART RATE: 82 BPM | WEIGHT: 129.13 LBS | BODY MASS INDEX: 21.82 KG/M2 | SYSTOLIC BLOOD PRESSURE: 107 MMHG | DIASTOLIC BLOOD PRESSURE: 70 MMHG

## 2019-06-07 DIAGNOSIS — Z3A.09 9 WEEKS GESTATION OF PREGNANCY: ICD-10-CM

## 2019-06-07 LAB
BILIRUBIN URINE: NEGATIVE MG/DL
BLOOD, URINE: NEGATIVE
CLARITY: CLEAR
COLOR: YELLOW
GLUCOSE URINE: NEGATIVE MG/DL
KETONES, URINE: NEGATIVE MG/DL
LEUKOCYTE ESTERASE, URINE: NEGATIVE
NITRITE, URINE: NEGATIVE
PH UA: 7 (ref 5–8)
PROTEIN UA: ABNORMAL MG/DL
SPECIFIC GRAVITY UA: 1.01 (ref 1–1.03)
UROBILINOGEN, URINE: 0.2 E.U./DL

## 2019-06-07 PROCEDURE — 99211 OFF/OP EST MAY X REQ PHY/QHP: CPT

## 2019-06-07 PROCEDURE — 81003 URINALYSIS AUTO W/O SCOPE: CPT

## 2019-06-07 PROCEDURE — 99212 OFFICE O/P EST SF 10 MIN: CPT

## 2019-06-07 NOTE — PLAN OF CARE
Prenatal Clinic  Clinical Level of [address].    NAME: Alverto Deal  YOB: 1995 GENDER: female  MEDICAL RECORD NUMBER:  [id number]  DATE:  6/7/2019    Assessment   Points   No Assessment []   0   General Prenatal assessment (e.g. weight, vital signs, urine test). Completed OB Clinic navigator tabs, in partnership with the Registered Nurse. [x]   1   General Prenatal assessment (e.g. weight, vital signs, urine test). Completed OB Clinic navigator tabs, in partnership with the Registered Nurse. Set up tests or procedures (e.g. ultrasound, specimen swabs, induction). []   2   Full Prenatal assessment (e.g. weight, vitals signs, urine test) to include a full review of history. Completed OB Clinic navigator tabs, in partnership with the Registered Nurse. Set up tests or procedures (e.g. ultrasound, specimen swabs, induction). Or transfer to an outside facility, transfer to [facility] for further evaluation, admission to the hospital.  []   3       Ambulation Status   Status Definition Points   Independent Independently able to ambulate. Fully able (without any assistance) to get on/off exam table/chair. [x]   0   Minimal Physical Assistance Requires physical assistance of one person to ambulate and/or position patient to be examined. Includes necessary physical assistance to position lower extremities on/off stool. []   1   Moderate Physical Assistance Requires at least one staff member to physically assist patient in ambulating into treatment room, and on/off recliner chair. []   2   Full Assistance Requires assistance of at least two staff members to transfer patient into treatment room and/or on/off exam table/chair. \"Total Transfer\". []   3       Teaching Effort   Effort Definition Points   No Teaching  []   0   General The teaching will focus on visit schedule and laboratory tests. This education can be found in the Discharge Instructions.  [x]   1 Intermediate The teaching will focus on visit schedule and laboratory tests, plus additional topics such as health and lifestyle (e.g. kick counts, diet). This education can be found in the Discharge Instructions []   2   Complex New patient information packet given to the patient/caregiver and reviewed with the Registered Nurse.   []   3       Patient Discharge and Planning  Planning Definition Points   General Follow-up with routine assessment and planning. Discharge instructions and After Visit Summary given to patient/caregiver and reviewed with the Registered Nurse. Simple follow-up with routine assessment and planning.    []   1   Intermediate Follow-up with routine assessment and planning. Discharge instructions and After Visit Summary given to patient/caregiver and reviewed with the Registered Nurse. Contact with additional resources (e.g. , Physician, Lactation). May include filling out forms, writing letters, communication with insurance , FLMA forms, etc.   [x]   2   Complex Full, comprehensive assessment and planning which includes assistance for a hospital admission or transfer to a higher level of care facility.   []   3     Is this the Patient's First Visit to the Prenatal Clinic    No     Is this Patient Established @ this [facility] AT [place]  Yes             Clinical Level of Care      Points  0-3  Level 1 []     Points  4-6  Level 2 [x]     Points  7-8  Level 3 []     Points  9-10  Level 4 []     Points  11-12  Level 5 []       Electronically signed by Jama Dash RN on 6/7/2019 at 10:42 AM

## 2019-06-07 NOTE — CARE COORDINATION
Therapist met with pt and administered the PHQ-9. Pt scored a 4. She stated that she has become more anxious because she knows the baby is coming and is trying to prepare mentally. Pt stated that she is able to cope using skills that we discussed . Pt stated that her baby shower is today. Pt is having a boy and she is happy with that. Pt stated that at times she feels stressed but has a good support system in place. I will see pt on next visit.     Patience Lines, REANNA

## 2019-06-07 NOTE — PROGRESS NOTES
Pt reports +FM. Denies any LOF, vaginal bleeding, regular ctx. RN discussed Tdap with pt today. Undecided but will let MD know, if desires.

## 2019-06-18 ENCOUNTER — HOSPITAL ENCOUNTER (OUTPATIENT)
Dept: OBGYN CLINIC | Age: 24
Discharge: HOME | End: 2019-06-18
Payer: MEDICAID

## 2019-06-18 VITALS
SYSTOLIC BLOOD PRESSURE: 118 MMHG | DIASTOLIC BLOOD PRESSURE: 73 MMHG | HEART RATE: 77 BPM | BODY MASS INDEX: 22.81 KG/M2 | WEIGHT: 135 LBS

## 2019-06-18 DIAGNOSIS — Z3A.09 9 WEEKS GESTATION OF PREGNANCY: ICD-10-CM

## 2019-06-18 LAB
AMPHETAMINE SCREEN, URINE: NORMAL
BARBITURATE SCREEN URINE: NORMAL
BENZODIAZEPINE SCREEN, URINE: NORMAL
BILIRUBIN URINE: NEGATIVE MG/DL
BLOOD, URINE: NEGATIVE
BUPRENORPHINE URINE: NORMAL
CANNABINOID SCREEN URINE: NORMAL
CLARITY: CLEAR
COCAINE METABOLITE SCREEN URINE: NORMAL
COLOR: YELLOW
GLUCOSE URINE: NEGATIVE MG/DL
KETONES, URINE: NEGATIVE MG/DL
LEUKOCYTE ESTERASE, URINE: ABNORMAL
Lab: NORMAL
METHADONE SCREEN, URINE: NORMAL
NITRITE, URINE: NEGATIVE
OPIATE SCREEN URINE: NORMAL
OXYCODONE URINE: NORMAL
PH UA: 5
PH UA: 6 (ref 5–8)
PHENCYCLIDINE SCREEN URINE: NORMAL
PROPOXYPHENE SCREEN: NORMAL
PROTEIN UA: ABNORMAL MG/DL
SPECIFIC GRAVITY UA: 1.02 (ref 1–1.03)
UROBILINOGEN, URINE: 0.2 E.U./DL

## 2019-06-18 PROCEDURE — 99212 OFFICE O/P EST SF 10 MIN: CPT | Performed by: OBSTETRICS & GYNECOLOGY

## 2019-06-18 PROCEDURE — 81003 URINALYSIS AUTO W/O SCOPE: CPT

## 2019-06-18 PROCEDURE — 99212 OFFICE O/P EST SF 10 MIN: CPT

## 2019-06-18 PROCEDURE — 80307 DRUG TEST PRSMV CHEM ANLYZR: CPT

## 2019-06-18 NOTE — CARE COORDINATION
Therapist met with pt and administered the PHQ-9. Pt scored a 4. Pt stated that she is ready to have her son. She talked about how tired she is and how she cant wait for it to all be over. Pt said she has not been sad and is happy overall. She has a good support system in place. \    Therapist will see pt on next visit. \    Alyne Matters, REANNA

## 2019-06-18 NOTE — PLAN OF CARE
Prenatal Clinic  Clinical Level of [address]    NAME: Dev Alexander  YOB: 1995 GENDER: female  MEDICAL RECORD NUMBER:  [id number]  DATE:  6/18/2019    Assessment   Points   No Assessment []   0   General Prenatal assessment (e.g. weight, vital signs, urine test). Completed OB Clinic navigator tabs, in partnership with the Registered Nurse. []   1   General Prenatal assessment (e.g. weight, vital signs, urine test). Completed OB Clinic navigator tabs, in partnership with the Registered Nurse. Set up tests or procedures (e.g. ultrasound, specimen swabs, induction). [x]   2   Full Prenatal assessment (e.g. weight, vitals signs, urine test) to include a full review of history. Completed OB Clinic navigator tabs, in partnership with the Registered Nurse. Set up tests or procedures (e.g. ultrasound, specimen swabs, induction). Or transfer to an outside facility, transfer to [facility] for further evaluation, admission to the hospital.  []   3       Ambulation Status   Status Definition Points   Independent Independently able to ambulate. Fully able (without any assistance) to get on/off exam table/chair. [x]   0   Minimal Physical Assistance Requires physical assistance of one person to ambulate and/or position patient to be examined. Includes necessary physical assistance to position lower extremities on/off stool. []   1   Moderate Physical Assistance Requires at least one staff member to physically assist patient in ambulating into treatment room, and on/off recliner chair. []   2   Full Assistance Requires assistance of at least two staff members to transfer patient into treatment room and/or on/off exam table/chair. \"Total Transfer\". []   3       Teaching Effort   Effort Definition Points   No Teaching  []   0   General The teaching will focus on visit schedule and laboratory tests. This education can be found in the Discharge Instructions.  [x]   1 Intermediate The teaching will focus on visit schedule and laboratory tests, plus additional topics such as health and lifestyle (e.g. kick counts, diet). This education can be found in the Discharge Instructions []   2   Complex New patient information packet given to the patient/caregiver and reviewed with the Registered Nurse.   []   3       Patient Discharge and Planning  Planning Definition Points   General Follow-up with routine assessment and planning. Discharge instructions and After Visit Summary given to patient/caregiver and reviewed with the Registered Nurse. Simple follow-up with routine assessment and planning.    []   1   Intermediate Follow-up with routine assessment and planning. Discharge instructions and After Visit Summary given to patient/caregiver and reviewed with the Registered Nurse. Contact with additional resources (e.g. , Physician, Lactation). May include filling out forms, writing letters, communication with insurance , FLMA forms, etc.   [x]   2   Complex Full, comprehensive assessment and planning which includes assistance for a hospital admission or transfer to a higher level of care facility.   []   3     Is this the Patient's First Visit to the Prenatal Clinic    No     Is this Patient Established @ this [facility] [place]  Yes             Clinical Level of Care      Points  0-3  Level 1 []     Points  4-6  Level 2 [x]     Points  7-8  Level 3 []     Points  9-10  Level 4 []     Points  11-12  Level 5 []       Electronically signed by Marie Pryor RN on 6/18/2019 at 2:53 PM

## 2019-06-18 NOTE — PROGRESS NOTES
Declines Tdap today
mouth daily   Yes Historical Provider, MD   docusate sodium (COLACE) 100 MG capsule Take 1 capsule by mouth daily as needed for Constipation 3/22/19  Yes Nik Genre, APRN - CNM       REVIEW OF SYSTEMS    Pertinent items are noted in HPI. Objective:      /73   Pulse 77   Wt 135 lb (61.2 kg)   LMP 10/21/2018 (Approximate)   BMI 22.81 kg/m²     Wt Readings from Last 2 Encounters:   19 135 lb (61.2 kg)   19 129 lb 2 oz (58.6 kg)       See ACOG flowsheet above    Physical Exam:  A physical exam was performed during this visit. Neurologic/Psychiatric: alert and oriented X3  Constitutional: alert and oriented to person, place and time, well-developed and well-nourished, in no acute distress  Cardiovascular: Edema  is not present. Respiratory effort: Respirations  are easy and without stridor. Gastrointestinal: Abdomen  is soft and  is non tender and gravid  Vagina: Not examined at this visit. Bladder: Not examined at this visit. Uterus:  Non tender. Pelvic Exam:  A pelvic exam was not completed at this visit. Assessment:     Patient Active Problem List   Diagnosis Code    26 yo G1 Z3A.09    Chlamydia infection affecting pregnancy O98.819, A74.9    ASCUS of cervix with negative high risk HPV R87.610       27 y.o.  34w2d    Obstetrician: Maria Granado MD      Plan:     - Prenatal labs: Reviewed by the physician. TOX TODAY. - Patient to follow up in: 2 weeks W/GBS &GC/HCL    Kick count reinforced. Pregnancy expectations were discussed and all questions were answered.  DECLINES TDAP                Electronically signed by Maria Granado MD on 2019 at 2:48 PM

## 2019-06-25 NOTE — PROGRESS NOTES
Prenatal [facility]  [address], [address] [facility], [facility] [address]      OB Follow-Up Visit Progress Note    Chief Complaint   Patient presents with    Routine Prenatal Visit        Subjective:     HISTORY OF PRESENT ILLNESS (HPI)    History of  Dev Valverde is a 27 y.o.,  at 35w2d who presents to the clinic for her follow-up OB visit. Ashley Torrez  denies any complaints at this time. She does not complain of vaginal bleeding. She does not report leakage of fluid. She  does not complain of contractions. She  does not complain of decreased fetal movement. PAST MEDICAL HISTORY        Diagnosis Date    ADHD     Asthma     Depression     Headache     Hip fracture (HCC)     right hip stress fx 2016    Varicella         PAST SURGICAL HISTORY    History reviewed. No pertinent surgical history. FAMILY HISTORY    Family History   Problem Relation Age of Onset    Mental Illness Mother     Diabetes Father     Asthma Brother     Other Brother     Breast Cancer Maternal Grandmother        SOCIAL HISTORY    Social History     Tobacco Use    Smoking status: Former Smoker     Packs/day: 1.00     Start date: 2018     Last attempt to quit: 2018     Years since quittin.5    Smokeless tobacco: Never Used   Substance Use Topics    Alcohol use: No     Comment: Prior to pregnancy    Drug use: No     Types: Marijuana     Comment: 1-2 times monthly - prior to pregnancy       ALLERGIES    No Known Allergies    MEDICATIONS    Current Outpatient Medications on File Prior to Encounter   Medication Sig Dispense Refill    Prenatal MV-Min-FA-Omega-3 (PRENATAL GUMMIES/DHA & FA) 0.4-32.5 MG CHEW Take 2 tablets by mouth daily      docusate sodium (COLACE) 100 MG capsule Take 1 capsule by mouth daily as needed for Constipation 30 capsule 11     No current facility-administered medications on file prior to encounter.         REVIEW OF SYSTEMS    Pertinent items are noted in HPI. Objective:      /70   Pulse 82   Wt 129 lb 2 oz (58.6 kg)   LMP 10/21/2018 (Approximate)   BMI 21.82 kg/m²     Wt Readings from Last 2 Encounters:   19 135 lb (61.2 kg)   19 129 lb 2 oz (58.6 kg)       See ACOG flowsheet above    Physical Exam:  A physical exam was not performed during this visit. Pelvic Exam:  A pelvic exam was not completed during this visit. Assessment:     Patient Active Problem List   Diagnosis Code    28 yo G1 Z3A.09    Chlamydia infection affecting pregnancy O98.819, A74.9    ASCUS of cervix with negative high risk HPV R87.610       27 y.o.  35w2d    Obstetrician: Roula Patel MD      Plan:     - Prenatal labs: Reviewed by the physician  - Patient to follow up in: 2 weeks    Kick count reinforced. Pregnancy expectations were discussed and all questions were answered.             Electronically signed by Roula Patel MD on 2019 at 1:03 PM

## 2019-07-02 ENCOUNTER — HOSPITAL ENCOUNTER (OUTPATIENT)
Dept: OBGYN CLINIC | Age: 24
Discharge: HOME | End: 2019-07-02
Payer: MEDICAID

## 2019-07-02 VITALS
HEART RATE: 75 BPM | SYSTOLIC BLOOD PRESSURE: 134 MMHG | DIASTOLIC BLOOD PRESSURE: 87 MMHG | BODY MASS INDEX: 23.72 KG/M2 | WEIGHT: 140.38 LBS

## 2019-07-02 DIAGNOSIS — Z3A.09 9 WEEKS GESTATION OF PREGNANCY: ICD-10-CM

## 2019-07-02 LAB
BILIRUBIN URINE: NEGATIVE MG/DL
BLOOD, URINE: NEGATIVE
CLARITY: CLEAR
COLOR: YELLOW
GLUCOSE URINE: NEGATIVE MG/DL
KETONES, URINE: ABNORMAL MG/DL
LEUKOCYTE ESTERASE, URINE: NEGATIVE
NITRITE, URINE: NEGATIVE
PH UA: 6 (ref 5–8)
PROTEIN UA: 100 MG/DL
SPECIFIC GRAVITY UA: 1.02 (ref 1–1.03)
UROBILINOGEN, URINE: 0.2 E.U./DL

## 2019-07-02 PROCEDURE — 99213 OFFICE O/P EST LOW 20 MIN: CPT | Performed by: OBSTETRICS & GYNECOLOGY

## 2019-07-02 PROCEDURE — 87591 N.GONORRHOEAE DNA AMP PROB: CPT

## 2019-07-02 PROCEDURE — 81003 URINALYSIS AUTO W/O SCOPE: CPT

## 2019-07-02 PROCEDURE — 87081 CULTURE SCREEN ONLY: CPT

## 2019-07-02 PROCEDURE — 87491 CHLMYD TRACH DNA AMP PROBE: CPT

## 2019-07-02 NOTE — CARE COORDINATION
LSW met with patient for follow up. Patient states that she received all infant care items at her baby shower, including car seat, crib, diapers, clothes and bottles. Patient states that she has not reached out to [facility] but does still have their contact information. Patient plans to use [facility] for infant care. At this time patient does not indicate any needs.    Electronically signed by Brandy Landry on 7/2/2019 at 2:40 PM

## 2019-07-02 NOTE — PROGRESS NOTES
Prenatal Clinic  Clinical Level of [address].    NAME: Jp Reece  YOB: 1995 GENDER: female  MEDICAL RECORD NUMBER:  [id number]  DATE:  7/2/2019    Assessment   Points   No Assessment []   0   General Prenatal assessment (e.g. weight, vital signs, urine test). Completed OB Clinic navigator tabs, in partnership with the Registered Nurse. [x]   1   General Prenatal assessment (e.g. weight, vital signs, urine test). Completed OB Clinic navigator tabs, in partnership with the Registered Nurse. Set up tests or procedures (e.g. ultrasound, specimen swabs, induction). []   2   Full Prenatal assessment (e.g. weight, vitals signs, urine test) to include a full review of history. Completed OB Clinic navigator tabs, in partnership with the Registered Nurse. Set up tests or procedures (e.g. ultrasound, specimen swabs, induction). Or transfer to an outside facility, transfer to [facility] for further evaluation, admission to the hospital.  []   3       Ambulation Status   Status Definition Points   Independent Independently able to ambulate. Fully able (without any assistance) to get on/off exam table/chair. [x]   0   Minimal Physical Assistance Requires physical assistance of one person to ambulate and/or position patient to be examined. Includes necessary physical assistance to position lower extremities on/off stool. []   1   Moderate Physical Assistance Requires at least one staff member to physically assist patient in ambulating into treatment room, and on/off recliner chair. []   2   Full Assistance Requires assistance of at least two staff members to transfer patient into treatment room and/or on/off exam table/chair. \"Total Transfer\". []   3       Teaching Effort   Effort Definition Points   No Teaching  [x]   0   General The teaching will focus on visit schedule and laboratory tests. This education can be found in the Discharge Instructions.  []   1

## 2019-07-03 LAB
C TRACH DNA GENITAL QL NAA+PROBE: POSITIVE
N. GONORRHOEAE DNA: NEGATIVE

## 2019-07-05 LAB — GROUP B STREP CULTURE: NORMAL

## 2019-07-08 ENCOUNTER — TELEPHONE (OUTPATIENT)
Dept: OBGYN CLINIC | Age: 24
End: 2019-07-08

## 2019-07-09 ENCOUNTER — TELEPHONE (OUTPATIENT)
Dept: OBGYN CLINIC | Age: 24
End: 2019-07-09

## 2019-07-09 RX ORDER — AZITHROMYCIN 500 MG/1
1000 TABLET, FILM COATED ORAL ONCE
COMMUNITY
End: 2019-07-12 | Stop reason: ALTCHOICE

## 2019-07-12 ENCOUNTER — HOSPITAL ENCOUNTER (OUTPATIENT)
Dept: OBGYN CLINIC | Age: 24
Discharge: HOME | End: 2019-07-12
Payer: MEDICAID

## 2019-07-12 VITALS
WEIGHT: 148.25 LBS | BODY MASS INDEX: 25.05 KG/M2 | SYSTOLIC BLOOD PRESSURE: 145 MMHG | DIASTOLIC BLOOD PRESSURE: 93 MMHG | HEART RATE: 74 BPM

## 2019-07-12 DIAGNOSIS — O98.813 CHLAMYDIA INFECTION AFFECTING PREGNANCY IN THIRD TRIMESTER: ICD-10-CM

## 2019-07-12 DIAGNOSIS — A74.9 CHLAMYDIA INFECTION AFFECTING PREGNANCY IN THIRD TRIMESTER: ICD-10-CM

## 2019-07-12 LAB
BILIRUBIN URINE: ABNORMAL MG/DL
BLOOD, URINE: ABNORMAL
CLARITY: CLEAR
COLOR: YELLOW
GLUCOSE URINE: NEGATIVE MG/DL
KETONES, URINE: ABNORMAL MG/DL
LEUKOCYTE ESTERASE, URINE: NEGATIVE
NITRITE, URINE: NEGATIVE
PH UA: 6.5 (ref 5–8)
PROTEIN UA: >=300 MG/DL
SPECIFIC GRAVITY UA: 1.02 (ref 1–1.03)
UROBILINOGEN, URINE: 1 E.U./DL

## 2019-07-12 PROCEDURE — 99213 OFFICE O/P EST LOW 20 MIN: CPT | Performed by: OBSTETRICS & GYNECOLOGY

## 2019-07-12 PROCEDURE — 81003 URINALYSIS AUTO W/O SCOPE: CPT

## 2019-07-12 PROCEDURE — 99212 OFFICE O/P EST SF 10 MIN: CPT

## 2019-07-12 NOTE — PLAN OF CARE
Prenatal Clinic  Clinical Level of [address]    NAME: Darryn Nguyễn  YOB: 1995 GENDER: female  MEDICAL RECORD NUMBER:  [id number]  DATE:  7/12/2019    Assessment   Points   No Assessment []   0   General Prenatal assessment (e.g. weight, vital signs, urine test). Completed OB Clinic navigator tabs, in partnership with the Registered Nurse. []   1   General Prenatal assessment (e.g. weight, vital signs, urine test). Completed OB Clinic navigator tabs, in partnership with the Registered Nurse. Set up tests or procedures (e.g. ultrasound, specimen swabs, induction). []   2   Full Prenatal assessment (e.g. weight, vitals signs, urine test) to include a full review of history. Completed OB Clinic navigator tabs, in partnership with the Registered Nurse. Set up tests or procedures (e.g. ultrasound, specimen swabs, induction). Or transfer to an outside facility, transfer to [facility] for further evaluation, admission to the hospital.  []   3       Ambulation Status   Status Definition Points   Independent Independently able to ambulate. Fully able (without any assistance) to get on/off exam table/chair. [x]   0   Minimal Physical Assistance Requires physical assistance of one person to ambulate and/or position patient to be examined. Includes necessary physical assistance to position lower extremities on/off stool. []   1   Moderate Physical Assistance Requires at least one staff member to physically assist patient in ambulating into treatment room, and on/off recliner chair. []   2   Full Assistance Requires assistance of at least two staff members to transfer patient into treatment room and/or on/off exam table/chair. \"Total Transfer\". []   3       Teaching Effort   Effort Definition Points   No Teaching  []   0   General The teaching will focus on visit schedule and laboratory tests. This education can be found in the Discharge Instructions.  []   1

## 2019-07-12 NOTE — PROGRESS NOTES
2 tablets by mouth daily   Yes Historical Provider, MD   docusate sodium (COLACE) 100 MG capsule Take 1 capsule by mouth daily as needed for Constipation 3/22/19   CESIA Perry CNM       REVIEW OF SYSTEMS    Pertinent items are noted in HPI. Objective:      BP (!) 145/93   Pulse 74   Wt 148 lb 4 oz (67.2 kg)   LMP 10/21/2018 (Approximate)   BMI 25.05 kg/m²     Wt Readings from Last 2 Encounters:   19 148 lb 4 oz (67.2 kg)   19 140 lb 6 oz (63.7 kg)       See ACOG flowsheet above    Physical Exam:  A physical exam was not performed during this visit. Pelvic Exam:  A pelvic exam was completed during this visit. Verbal consent obtained for pelvic exam:  yes  Cervix: closed/thick/-2, ceph   Specimens obtained: None. Response to pelvic exam:  Well tolerated by patient. Assessment:     Patient Active Problem List   Diagnosis Code    26 yo G1 Z3A.09    Chlamydia infection affecting pregnancy O98.819, A74.9    ASCUS of cervix with negative high risk HPV R87.610       27 y.o.  37w5d    Obstetrician: Zeinab Severino MD      Plan:     - Prenatal labs: Reviewed by the physician  - Patient to follow up in:  1 week      Kick count reinforced. Pregnancy expectations were discussed and all questions were answered. 70 % of time was spent discussing findings, management, and treatment options.         Electronically signed by Zeinab Severino MD on 2019 at 11:05 AM

## 2019-07-19 ENCOUNTER — ANESTHESIA EVENT (OUTPATIENT)
Dept: LABOR AND DELIVERY | Age: 24
DRG: 560 | End: 2019-07-19
Payer: MEDICAID

## 2019-07-19 ENCOUNTER — HOSPITAL ENCOUNTER (OUTPATIENT)
Dept: OBGYN CLINIC | Age: 24
Discharge: HOME | DRG: 560 | End: 2019-07-19
Payer: MEDICAID

## 2019-07-19 ENCOUNTER — HOSPITAL ENCOUNTER (INPATIENT)
Age: 24
LOS: 3 days | Discharge: HOME | DRG: 560 | End: 2019-07-22
Attending: OBSTETRICS & GYNECOLOGY | Admitting: OBSTETRICS & GYNECOLOGY
Payer: MEDICAID

## 2019-07-19 ENCOUNTER — ANESTHESIA (OUTPATIENT)
Dept: LABOR AND DELIVERY | Age: 24
DRG: 560 | End: 2019-07-19
Payer: MEDICAID

## 2019-07-19 VITALS
WEIGHT: 148.13 LBS | BODY MASS INDEX: 25.03 KG/M2 | SYSTOLIC BLOOD PRESSURE: 149 MMHG | HEART RATE: 84 BPM | DIASTOLIC BLOOD PRESSURE: 94 MMHG

## 2019-07-19 DIAGNOSIS — Z3A.09 9 WEEKS GESTATION OF PREGNANCY: ICD-10-CM

## 2019-07-19 PROBLEM — O14.93 PREECLAMPSIA, THIRD TRIMESTER: Status: ACTIVE | Noted: 2019-07-19

## 2019-07-19 LAB
A/G RATIO: 0.8 (ref 1.1–2.2)
ABO/RH: NORMAL
ALBUMIN SERPL-MCNC: 2.6 G/DL (ref 3.4–5)
ALP BLD-CCNC: 162 U/L (ref 40–129)
ALT SERPL-CCNC: 15 U/L (ref 10–40)
ANION GAP SERPL CALCULATED.3IONS-SCNC: 12 MMOL/L (ref 3–16)
ANTIBODY SCREEN: NORMAL
APTT: 27.6 SEC (ref 26–36)
AST SERPL-CCNC: 25 U/L (ref 15–37)
BASOPHILS ABSOLUTE: 0 K/UL (ref 0–0.2)
BASOPHILS RELATIVE PERCENT: 0.4 %
BILIRUB SERPL-MCNC: <0.2 MG/DL (ref 0–1)
BILIRUBIN URINE: ABNORMAL MG/DL
BILIRUBIN URINE: NEGATIVE
BLOOD, URINE: ABNORMAL
BLOOD, URINE: ABNORMAL
BUN BLDV-MCNC: 8 MG/DL (ref 7–20)
CALCIUM SERPL-MCNC: 8.5 MG/DL (ref 8.3–10.6)
CASTS: ABNORMAL /LPF
CHLORIDE BLD-SCNC: 106 MMOL/L (ref 99–110)
CLARITY: ABNORMAL
CLARITY: CLEAR
CO2: 20 MMOL/L (ref 21–32)
COLOR: YELLOW
COLOR: YELLOW
CREAT SERPL-MCNC: <0.5 MG/DL (ref 0.6–1.1)
EOSINOPHILS ABSOLUTE: 0 K/UL (ref 0–0.6)
EOSINOPHILS RELATIVE PERCENT: 0.5 %
EPITHELIAL CELLS, UA: 12 /HPF (ref 0–5)
FIBRINOGEN: 523 MG/DL (ref 200–397)
GFR AFRICAN AMERICAN: >60
GFR NON-AFRICAN AMERICAN: >60
GLOBULIN: 3.1 G/DL
GLUCOSE BLD-MCNC: 82 MG/DL (ref 70–99)
GLUCOSE URINE: NEGATIVE MG/DL
GLUCOSE URINE: NEGATIVE MG/DL
HCT VFR BLD CALC: 32.4 % (ref 36–48)
HEMOGLOBIN: 10.8 G/DL (ref 12–16)
INR BLD: 0.85 (ref 0.86–1.14)
KETONES, URINE: ABNORMAL MG/DL
KETONES, URINE: NEGATIVE MG/DL
LEUKOCYTE ESTERASE, URINE: ABNORMAL
LEUKOCYTE ESTERASE, URINE: NEGATIVE
LYMPHOCYTES ABSOLUTE: 0.9 K/UL (ref 1–5.1)
LYMPHOCYTES RELATIVE PERCENT: 13 %
MAGNESIUM: 5.8 MG/DL (ref 1.8–2.4)
MCH RBC QN AUTO: 30.1 PG (ref 26–34)
MCHC RBC AUTO-ENTMCNC: 33.4 G/DL (ref 31–36)
MCV RBC AUTO: 90.1 FL (ref 80–100)
MICROSCOPIC EXAMINATION: YES
MONOCYTES ABSOLUTE: 0.6 K/UL (ref 0–1.3)
MONOCYTES RELATIVE PERCENT: 8.3 %
MUCUS: ABNORMAL /LPF
NEUTROPHILS ABSOLUTE: 5.6 K/UL (ref 1.7–7.7)
NEUTROPHILS RELATIVE PERCENT: 77.8 %
NITRITE, URINE: NEGATIVE
NITRITE, URINE: NEGATIVE
PDW BLD-RTO: 13.7 % (ref 12.4–15.4)
PH UA: 6.5 (ref 5–8)
PH UA: 6.5 (ref 5–8)
PLATELET # BLD: 210 K/UL (ref 135–450)
PMV BLD AUTO: 10.4 FL (ref 5–10.5)
POTASSIUM SERPL-SCNC: 4 MMOL/L (ref 3.5–5.1)
PROTEIN UA: >=300 MG/DL
PROTEIN UA: >=300 MG/DL
PROTHROMBIN TIME: 9.7 SEC (ref 9.8–13)
RBC # BLD: 3.6 M/UL (ref 4–5.2)
RBC UA: 5 /HPF (ref 0–4)
SODIUM BLD-SCNC: 138 MMOL/L (ref 136–145)
SPECIFIC GRAVITY UA: 1.02 (ref 1–1.03)
SPECIFIC GRAVITY UA: 1.02 (ref 1–1.03)
TOTAL PROTEIN: 5.7 G/DL (ref 6.4–8.2)
URIC ACID, SERUM: 5.3 MG/DL (ref 2.6–6)
URINE TYPE: ABNORMAL
UROBILINOGEN, URINE: 1 E.U./DL
UROBILINOGEN, URINE: 1 E.U./DL
WBC # BLD: 7.1 K/UL (ref 4–11)
WBC UA: 12 /HPF (ref 0–5)

## 2019-07-19 PROCEDURE — 85730 THROMBOPLASTIN TIME PARTIAL: CPT

## 2019-07-19 PROCEDURE — 2500000003 HC RX 250 WO HCPCS: Performed by: OBSTETRICS & GYNECOLOGY

## 2019-07-19 PROCEDURE — 86901 BLOOD TYPING SEROLOGIC RH(D): CPT

## 2019-07-19 PROCEDURE — 99212 OFFICE O/P EST SF 10 MIN: CPT | Performed by: OBSTETRICS & GYNECOLOGY

## 2019-07-19 PROCEDURE — 85384 FIBRINOGEN ACTIVITY: CPT

## 2019-07-19 PROCEDURE — 6370000000 HC RX 637 (ALT 250 FOR IP): Performed by: OBSTETRICS & GYNECOLOGY

## 2019-07-19 PROCEDURE — 83735 ASSAY OF MAGNESIUM: CPT

## 2019-07-19 PROCEDURE — 81003 URINALYSIS AUTO W/O SCOPE: CPT

## 2019-07-19 PROCEDURE — 6360000002 HC RX W HCPCS: Performed by: OBSTETRICS & GYNECOLOGY

## 2019-07-19 PROCEDURE — 86850 RBC ANTIBODY SCREEN: CPT

## 2019-07-19 PROCEDURE — 80053 COMPREHEN METABOLIC PANEL: CPT

## 2019-07-19 PROCEDURE — 85610 PROTHROMBIN TIME: CPT

## 2019-07-19 PROCEDURE — 84550 ASSAY OF BLOOD/URIC ACID: CPT

## 2019-07-19 PROCEDURE — 81001 URINALYSIS AUTO W/SCOPE: CPT

## 2019-07-19 PROCEDURE — 2580000003 HC RX 258: Performed by: OBSTETRICS & GYNECOLOGY

## 2019-07-19 PROCEDURE — 1220000000 HC SEMI PRIVATE OB R&B

## 2019-07-19 PROCEDURE — 86900 BLOOD TYPING SEROLOGIC ABO: CPT

## 2019-07-19 PROCEDURE — 99212 OFFICE O/P EST SF 10 MIN: CPT

## 2019-07-19 PROCEDURE — 85025 COMPLETE CBC W/AUTO DIFF WBC: CPT

## 2019-07-19 RX ORDER — ONDANSETRON 2 MG/ML
4 INJECTION INTRAMUSCULAR; INTRAVENOUS EVERY 6 HOURS PRN
Status: DISCONTINUED | OUTPATIENT
Start: 2019-07-19 | End: 2019-07-21

## 2019-07-19 RX ORDER — SODIUM CHLORIDE 0.9 % (FLUSH) 0.9 %
10 SYRINGE (ML) INJECTION PRN
Status: DISCONTINUED | OUTPATIENT
Start: 2019-07-19 | End: 2019-07-21

## 2019-07-19 RX ORDER — NALBUPHINE HCL 10 MG/ML
5 AMPUL (ML) INJECTION EVERY 4 HOURS PRN
Status: DISCONTINUED | OUTPATIENT
Start: 2019-07-19 | End: 2019-07-21

## 2019-07-19 RX ORDER — NALOXONE HYDROCHLORIDE 0.4 MG/ML
0.4 INJECTION, SOLUTION INTRAMUSCULAR; INTRAVENOUS; SUBCUTANEOUS PRN
Status: DISCONTINUED | OUTPATIENT
Start: 2019-07-19 | End: 2019-07-21

## 2019-07-19 RX ORDER — MAGNESIUM SULFATE IN WATER 40 MG/ML
4 INJECTION, SOLUTION INTRAVENOUS ONCE
Status: COMPLETED | OUTPATIENT
Start: 2019-07-19 | End: 2019-07-19

## 2019-07-19 RX ORDER — TERBUTALINE SULFATE 1 MG/ML
0.25 INJECTION, SOLUTION SUBCUTANEOUS
Status: ACTIVE | OUTPATIENT
Start: 2019-07-19 | End: 2019-07-19

## 2019-07-19 RX ORDER — HYDRALAZINE HYDROCHLORIDE 20 MG/ML
10 INJECTION INTRAMUSCULAR; INTRAVENOUS ONCE
Status: COMPLETED | OUTPATIENT
Start: 2019-07-19 | End: 2019-07-19

## 2019-07-19 RX ORDER — ACETAMINOPHEN 325 MG/1
650 TABLET ORAL EVERY 4 HOURS PRN
Status: DISCONTINUED | OUTPATIENT
Start: 2019-07-19 | End: 2019-07-21

## 2019-07-19 RX ORDER — LABETALOL HYDROCHLORIDE 5 MG/ML
10 INJECTION, SOLUTION INTRAVENOUS ONCE
Status: COMPLETED | OUTPATIENT
Start: 2019-07-19 | End: 2019-07-19

## 2019-07-19 RX ORDER — HYDRALAZINE HYDROCHLORIDE 20 MG/ML
5 INJECTION INTRAMUSCULAR; INTRAVENOUS ONCE
Status: COMPLETED | OUTPATIENT
Start: 2019-07-19 | End: 2019-07-19

## 2019-07-19 RX ORDER — ACETAMINOPHEN 325 MG/1
TABLET ORAL
Status: DISPENSED
Start: 2019-07-19 | End: 2019-07-20

## 2019-07-19 RX ORDER — DIPHENHYDRAMINE HYDROCHLORIDE 50 MG/ML
25 INJECTION INTRAMUSCULAR; INTRAVENOUS EVERY 6 HOURS PRN
Status: DISCONTINUED | OUTPATIENT
Start: 2019-07-19 | End: 2019-07-21

## 2019-07-19 RX ORDER — HYDRALAZINE HYDROCHLORIDE 20 MG/ML
INJECTION INTRAMUSCULAR; INTRAVENOUS
Status: DISPENSED
Start: 2019-07-19 | End: 2019-07-20

## 2019-07-19 RX ORDER — SODIUM CHLORIDE, SODIUM LACTATE, POTASSIUM CHLORIDE, CALCIUM CHLORIDE 600; 310; 30; 20 MG/100ML; MG/100ML; MG/100ML; MG/100ML
INJECTION, SOLUTION INTRAVENOUS CONTINUOUS
Status: DISCONTINUED | OUTPATIENT
Start: 2019-07-19 | End: 2019-07-21

## 2019-07-19 RX ORDER — MAGNESIUM SULFATE IN WATER 40 MG/ML
INJECTION, SOLUTION INTRAVENOUS
Status: DISPENSED
Start: 2019-07-19 | End: 2019-07-20

## 2019-07-19 RX ORDER — MAGNESIUM SULFATE 4 G/50ML
4 INJECTION INTRAVENOUS ONCE
Status: DISCONTINUED | OUTPATIENT
Start: 2019-07-19 | End: 2019-07-19

## 2019-07-19 RX ORDER — SODIUM CHLORIDE 0.9 % (FLUSH) 0.9 %
10 SYRINGE (ML) INJECTION EVERY 12 HOURS SCHEDULED
Status: DISCONTINUED | OUTPATIENT
Start: 2019-07-19 | End: 2019-07-21

## 2019-07-19 RX ADMIN — LABETALOL HYDROCHLORIDE 10 MG: 5 INJECTION INTRAVENOUS at 12:12

## 2019-07-19 RX ADMIN — Medication 10 ML: at 19:15

## 2019-07-19 RX ADMIN — DINOPROSTONE 10 MG: 10 INSERT VAGINAL at 13:17

## 2019-07-19 RX ADMIN — SODIUM CHLORIDE, POTASSIUM CHLORIDE, SODIUM LACTATE AND CALCIUM CHLORIDE: 600; 310; 30; 20 INJECTION, SOLUTION INTRAVENOUS at 11:30

## 2019-07-19 RX ADMIN — HYDRALAZINE HYDROCHLORIDE 10 MG: 20 INJECTION INTRAMUSCULAR; INTRAVENOUS at 19:12

## 2019-07-19 RX ADMIN — MAGNESIUM SULFATE HEPTAHYDRATE 4 G: 40 INJECTION, SOLUTION INTRAVENOUS at 12:52

## 2019-07-19 RX ADMIN — ACETAMINOPHEN 650 MG: 325 TABLET ORAL at 16:30

## 2019-07-19 RX ADMIN — SODIUM CHLORIDE, POTASSIUM CHLORIDE, SODIUM LACTATE AND CALCIUM CHLORIDE: 600; 310; 30; 20 INJECTION, SOLUTION INTRAVENOUS at 21:03

## 2019-07-19 RX ADMIN — HYDRALAZINE HYDROCHLORIDE 5 MG: 20 INJECTION INTRAMUSCULAR; INTRAVENOUS at 12:40

## 2019-07-19 RX ADMIN — MAGNESIUM SULFATE IN WATER 2 G/HR: 40 INJECTION, SOLUTION INTRAVENOUS at 22:19

## 2019-07-19 RX ADMIN — HYDRALAZINE HYDROCHLORIDE 10 MG: 20 INJECTION INTRAMUSCULAR; INTRAVENOUS at 15:55

## 2019-07-19 RX ADMIN — MAGNESIUM SULFATE IN WATER 2 G/HR: 40 INJECTION, SOLUTION INTRAVENOUS at 13:14

## 2019-07-19 ASSESSMENT — PAIN DESCRIPTION - DESCRIPTORS
DESCRIPTORS: ACHING
DESCRIPTORS: DISCOMFORT
DESCRIPTORS: DISCOMFORT
DESCRIPTORS: ACHING
DESCRIPTORS: DISCOMFORT

## 2019-07-19 ASSESSMENT — PAIN SCALES - GENERAL: PAINLEVEL_OUTOF10: 2

## 2019-07-19 NOTE — FLOWSHEET NOTE
Patient here for scheduled prenatal check up. Chart reviewed with patient. C/O sciatica at times, but resolves easily. Discussed hormonal shift after baby is born, contact physician if feeling depressed or wants to do harm to herself or infant. Reports +FM. Reviewed how to time contractions. Denies vaginal bleeding or leaking of fluid.

## 2019-07-20 LAB
AMPHETAMINE SCREEN, URINE: ABNORMAL
BARBITURATE SCREEN URINE: ABNORMAL
BENZODIAZEPINE SCREEN, URINE: ABNORMAL
BILIRUBIN URINE: NEGATIVE
BLOOD, URINE: NEGATIVE
BUPRENORPHINE URINE: ABNORMAL
CANNABINOID SCREEN URINE: ABNORMAL
CLARITY: ABNORMAL
COCAINE METABOLITE SCREEN URINE: ABNORMAL
COLOR: ABNORMAL
COMMENT UA: ABNORMAL
EPITHELIAL CELLS, UA: 2 /HPF (ref 0–5)
GLUCOSE URINE: NEGATIVE MG/DL
HYALINE CASTS: 8 /LPF (ref 0–8)
KETONES, URINE: ABNORMAL MG/DL
LEUKOCYTE ESTERASE, URINE: ABNORMAL
Lab: ABNORMAL
METHADONE SCREEN, URINE: ABNORMAL
MICROSCOPIC EXAMINATION: YES
NITRITE, URINE: NEGATIVE
OPIATE SCREEN URINE: POSITIVE
OXYCODONE URINE: ABNORMAL
PH UA: 5
PH UA: 5.5 (ref 5–8)
PHENCYCLIDINE SCREEN URINE: ABNORMAL
PROPOXYPHENE SCREEN: ABNORMAL
PROTEIN UA: >=300 MG/DL
RBC UA: >100 /HPF (ref 0–2)
SPECIFIC GRAVITY UA: 1.02 (ref 1–1.03)
URINE REFLEX TO CULTURE: YES
URINE TYPE: ABNORMAL
UROBILINOGEN, URINE: 0.2 E.U./DL
WBC UA: 5 /HPF (ref 0–5)

## 2019-07-20 PROCEDURE — 6360000002 HC RX W HCPCS: Performed by: OBSTETRICS & GYNECOLOGY

## 2019-07-20 PROCEDURE — 2500000003 HC RX 250 WO HCPCS: Performed by: NURSE ANESTHETIST, CERTIFIED REGISTERED

## 2019-07-20 PROCEDURE — 3E033VJ INTRODUCTION OF OTHER HORMONE INTO PERIPHERAL VEIN, PERCUTANEOUS APPROACH: ICD-10-PCS | Performed by: OBSTETRICS & GYNECOLOGY

## 2019-07-20 PROCEDURE — 3E0P7VZ INTRODUCTION OF HORMONE INTO FEMALE REPRODUCTIVE, VIA NATURAL OR ARTIFICIAL OPENING: ICD-10-PCS | Performed by: OBSTETRICS & GYNECOLOGY

## 2019-07-20 PROCEDURE — 2500000003 HC RX 250 WO HCPCS: Performed by: ANESTHESIOLOGY

## 2019-07-20 PROCEDURE — 80307 DRUG TEST PRSMV CHEM ANLYZR: CPT

## 2019-07-20 PROCEDURE — 87077 CULTURE AEROBIC IDENTIFY: CPT

## 2019-07-20 PROCEDURE — 2580000003 HC RX 258: Performed by: OBSTETRICS & GYNECOLOGY

## 2019-07-20 PROCEDURE — 81001 URINALYSIS AUTO W/SCOPE: CPT

## 2019-07-20 PROCEDURE — 7200000001 HC VAGINAL DELIVERY

## 2019-07-20 PROCEDURE — 87186 SC STD MICRODIL/AGAR DIL: CPT

## 2019-07-20 PROCEDURE — G0480 DRUG TEST DEF 1-7 CLASSES: HCPCS

## 2019-07-20 PROCEDURE — 59409 OBSTETRICAL CARE: CPT | Performed by: OBSTETRICS & GYNECOLOGY

## 2019-07-20 PROCEDURE — 87086 URINE CULTURE/COLONY COUNT: CPT

## 2019-07-20 PROCEDURE — 1220000000 HC SEMI PRIVATE OB R&B

## 2019-07-20 PROCEDURE — 59025 FETAL NON-STRESS TEST: CPT

## 2019-07-20 PROCEDURE — 51701 INSERT BLADDER CATHETER: CPT

## 2019-07-20 PROCEDURE — 3700000025 EPIDURAL BLOCK: Performed by: ANESTHESIOLOGY

## 2019-07-20 PROCEDURE — 6370000000 HC RX 637 (ALT 250 FOR IP): Performed by: OBSTETRICS & GYNECOLOGY

## 2019-07-20 RX ORDER — HYDRALAZINE HYDROCHLORIDE 20 MG/ML
10 INJECTION INTRAMUSCULAR; INTRAVENOUS ONCE
Status: COMPLETED | OUTPATIENT
Start: 2019-07-20 | End: 2019-07-20

## 2019-07-20 RX ORDER — BUPIVACAINE HYDROCHLORIDE 2.5 MG/ML
INJECTION, SOLUTION EPIDURAL; INFILTRATION; INTRACAUDAL PRN
Status: DISCONTINUED | OUTPATIENT
Start: 2019-07-20 | End: 2019-07-20 | Stop reason: SDUPTHER

## 2019-07-20 RX ORDER — CARBOPROST TROMETHAMINE 250 UG/ML
250 INJECTION, SOLUTION INTRAMUSCULAR ONCE
Status: COMPLETED | OUTPATIENT
Start: 2019-07-20 | End: 2019-07-20

## 2019-07-20 RX ORDER — NALBUPHINE HCL 10 MG/ML
5 AMPUL (ML) INJECTION EVERY 4 HOURS PRN
Status: DISCONTINUED | OUTPATIENT
Start: 2019-07-20 | End: 2019-07-21

## 2019-07-20 RX ORDER — LIDOCAINE HYDROCHLORIDE 10 MG/ML
INJECTION, SOLUTION INFILTRATION; PERINEURAL PRN
Status: DISCONTINUED | OUTPATIENT
Start: 2019-07-20 | End: 2019-07-20 | Stop reason: SDUPTHER

## 2019-07-20 RX ORDER — IBUPROFEN 400 MG/1
800 TABLET ORAL EVERY 6 HOURS PRN
Status: DISPENSED | OUTPATIENT
Start: 2019-07-20 | End: 2019-07-22

## 2019-07-20 RX ORDER — SODIUM CHLORIDE, SODIUM LACTATE, POTASSIUM CHLORIDE, CALCIUM CHLORIDE 600; 310; 30; 20 MG/100ML; MG/100ML; MG/100ML; MG/100ML
INJECTION, SOLUTION INTRAVENOUS CONTINUOUS
Status: DISCONTINUED | OUTPATIENT
Start: 2019-07-20 | End: 2019-07-21

## 2019-07-20 RX ORDER — DOCUSATE SODIUM 100 MG/1
100 CAPSULE, LIQUID FILLED ORAL 2 TIMES DAILY
Status: DISCONTINUED | OUTPATIENT
Start: 2019-07-20 | End: 2019-07-22 | Stop reason: HOSPADM

## 2019-07-20 RX ORDER — SODIUM CHLORIDE 0.9 % (FLUSH) 0.9 %
10 SYRINGE (ML) INJECTION EVERY 12 HOURS SCHEDULED
Status: DISCONTINUED | OUTPATIENT
Start: 2019-07-20 | End: 2019-07-22 | Stop reason: HOSPADM

## 2019-07-20 RX ORDER — ONDANSETRON 4 MG/1
8 TABLET, FILM COATED ORAL EVERY 8 HOURS PRN
Status: DISCONTINUED | OUTPATIENT
Start: 2019-07-20 | End: 2019-07-22 | Stop reason: HOSPADM

## 2019-07-20 RX ORDER — ONDANSETRON 2 MG/ML
4 INJECTION INTRAMUSCULAR; INTRAVENOUS EVERY 6 HOURS PRN
Status: DISCONTINUED | OUTPATIENT
Start: 2019-07-20 | End: 2019-07-22 | Stop reason: HOSPADM

## 2019-07-20 RX ORDER — LIDOCAINE HYDROCHLORIDE AND EPINEPHRINE 20; 5 MG/ML; UG/ML
INJECTION, SOLUTION EPIDURAL; INFILTRATION; INTRACAUDAL; PERINEURAL PRN
Status: DISCONTINUED | OUTPATIENT
Start: 2019-07-20 | End: 2019-07-20 | Stop reason: SDUPTHER

## 2019-07-20 RX ORDER — BUPIVACAINE HYDROCHLORIDE 5 MG/ML
INJECTION, SOLUTION EPIDURAL; INTRACAUDAL PRN
Status: DISCONTINUED | OUTPATIENT
Start: 2019-07-20 | End: 2019-07-20 | Stop reason: SDUPTHER

## 2019-07-20 RX ORDER — MISOPROSTOL 100 UG/1
100 TABLET ORAL 3 TIMES DAILY
Status: COMPLETED | OUTPATIENT
Start: 2019-07-20 | End: 2019-07-21

## 2019-07-20 RX ORDER — MISOPROSTOL 200 UG/1
TABLET ORAL
Status: DISPENSED
Start: 2019-07-20 | End: 2019-07-21

## 2019-07-20 RX ORDER — MISOPROSTOL 200 UG/1
800 TABLET ORAL
Status: COMPLETED | OUTPATIENT
Start: 2019-07-21 | End: 2019-07-20

## 2019-07-20 RX ORDER — ACETAMINOPHEN 325 MG/1
650 TABLET ORAL EVERY 4 HOURS PRN
Status: DISCONTINUED | OUTPATIENT
Start: 2019-07-20 | End: 2019-07-22 | Stop reason: HOSPADM

## 2019-07-20 RX ORDER — HYDROCODONE BITARTRATE AND ACETAMINOPHEN 5; 325 MG/1; MG/1
1 TABLET ORAL EVERY 4 HOURS PRN
Status: DISCONTINUED | OUTPATIENT
Start: 2019-07-20 | End: 2019-07-22 | Stop reason: HOSPADM

## 2019-07-20 RX ORDER — LANOLIN 100 %
OINTMENT (GRAM) TOPICAL PRN
Status: DISCONTINUED | OUTPATIENT
Start: 2019-07-20 | End: 2019-07-22 | Stop reason: HOSPADM

## 2019-07-20 RX ORDER — ONDANSETRON 2 MG/ML
4 INJECTION INTRAMUSCULAR; INTRAVENOUS EVERY 6 HOURS PRN
Status: DISCONTINUED | OUTPATIENT
Start: 2019-07-20 | End: 2019-07-21

## 2019-07-20 RX ORDER — SODIUM CHLORIDE 0.9 % (FLUSH) 0.9 %
10 SYRINGE (ML) INJECTION PRN
Status: DISCONTINUED | OUTPATIENT
Start: 2019-07-20 | End: 2019-07-21

## 2019-07-20 RX ORDER — MINERAL OIL 471.99 G/472ML
OIL TOPICAL ONCE
Status: COMPLETED | OUTPATIENT
Start: 2019-07-20 | End: 2019-07-20

## 2019-07-20 RX ORDER — SODIUM CHLORIDE, SODIUM LACTATE, POTASSIUM CHLORIDE, CALCIUM CHLORIDE 600; 310; 30; 20 MG/100ML; MG/100ML; MG/100ML; MG/100ML
INJECTION, SOLUTION INTRAVENOUS CONTINUOUS
Status: DISCONTINUED | OUTPATIENT
Start: 2019-07-20 | End: 2019-07-22 | Stop reason: HOSPADM

## 2019-07-20 RX ORDER — LABETALOL 200 MG/1
200 TABLET, FILM COATED ORAL EVERY 12 HOURS SCHEDULED
Status: DISCONTINUED | OUTPATIENT
Start: 2019-07-20 | End: 2019-07-22 | Stop reason: HOSPADM

## 2019-07-20 RX ADMIN — Medication 8 MILLI-UNITS/MIN: at 05:07

## 2019-07-20 RX ADMIN — MAGNESIUM SULFATE IN WATER 2 G/HR: 40 INJECTION, SOLUTION INTRAVENOUS at 08:11

## 2019-07-20 RX ADMIN — DOCUSATE SODIUM 100 MG: 100 CAPSULE, LIQUID FILLED ORAL at 21:31

## 2019-07-20 RX ADMIN — Medication 1 MILLI-UNITS/MIN: at 03:02

## 2019-07-20 RX ADMIN — HYDRALAZINE HYDROCHLORIDE 10 MG: 20 INJECTION INTRAMUSCULAR; INTRAVENOUS at 18:10

## 2019-07-20 RX ADMIN — MISOPROSTOL 100 MCG: 100 TABLET ORAL at 21:30

## 2019-07-20 RX ADMIN — MISOPROSTOL 800 MCG: 200 TABLET ORAL at 16:46

## 2019-07-20 RX ADMIN — Medication 12 ML/HR: at 11:50

## 2019-07-20 RX ADMIN — Medication 14 MILLI-UNITS/MIN: at 06:30

## 2019-07-20 RX ADMIN — ONDANSETRON 4 MG: 2 INJECTION INTRAMUSCULAR; INTRAVENOUS at 10:22

## 2019-07-20 RX ADMIN — Medication 6 MILLI-UNITS/MIN: at 04:35

## 2019-07-20 RX ADMIN — MAGNESIUM SULFATE IN WATER 2 G/HR: 40 INJECTION, SOLUTION INTRAVENOUS at 17:56

## 2019-07-20 RX ADMIN — Medication 2 MILLI-UNITS/MIN: at 03:32

## 2019-07-20 RX ADMIN — IBUPROFEN 800 MG: 400 TABLET ORAL at 18:30

## 2019-07-20 RX ADMIN — Medication 12 MILLI-UNITS/MIN: at 06:03

## 2019-07-20 RX ADMIN — SODIUM CHLORIDE, POTASSIUM CHLORIDE, SODIUM LACTATE AND CALCIUM CHLORIDE: 600; 310; 30; 20 INJECTION, SOLUTION INTRAVENOUS at 16:20

## 2019-07-20 RX ADMIN — BUPIVACAINE HYDROCHLORIDE 5 ML: 5 INJECTION, SOLUTION EPIDURAL; INTRACAUDAL at 14:06

## 2019-07-20 RX ADMIN — LIDOCAINE HYDROCHLORIDE 2 ML: 10 INJECTION, SOLUTION INFILTRATION; PERINEURAL at 11:40

## 2019-07-20 RX ADMIN — LIDOCAINE HYDROCHLORIDE,EPINEPHRINE BITARTRATE 4 ML: 20; .005 INJECTION, SOLUTION EPIDURAL; INFILTRATION; INTRACAUDAL; PERINEURAL at 14:30

## 2019-07-20 RX ADMIN — SODIUM CHLORIDE, POTASSIUM CHLORIDE, SODIUM LACTATE AND CALCIUM CHLORIDE: 600; 310; 30; 20 INJECTION, SOLUTION INTRAVENOUS at 12:43

## 2019-07-20 RX ADMIN — Medication 4 MILLI-UNITS/MIN: at 03:58

## 2019-07-20 RX ADMIN — Medication 10 MILLI-UNITS/MIN: at 05:28

## 2019-07-20 RX ADMIN — BUPIVACAINE HYDROCHLORIDE 5 ML: 2.5 INJECTION, SOLUTION EPIDURAL; INFILTRATION; INTRACAUDAL at 11:48

## 2019-07-20 RX ADMIN — SODIUM CHLORIDE, POTASSIUM CHLORIDE, SODIUM LACTATE AND CALCIUM CHLORIDE: 600; 310; 30; 20 INJECTION, SOLUTION INTRAVENOUS at 10:07

## 2019-07-20 RX ADMIN — LABETALOL HYDROCHLORIDE 200 MG: 200 TABLET, FILM COATED ORAL at 21:31

## 2019-07-20 RX ADMIN — MINERAL OIL: 471.99 OIL TOPICAL at 11:00

## 2019-07-20 ASSESSMENT — PAIN DESCRIPTION - DESCRIPTORS
DESCRIPTORS: DISCOMFORT
DESCRIPTORS: PRESSURE
DESCRIPTORS: PRESSURE
DESCRIPTORS: DISCOMFORT
DESCRIPTORS: CRAMPING;DISCOMFORT
DESCRIPTORS: DISCOMFORT
DESCRIPTORS: CRAMPING;DISCOMFORT
DESCRIPTORS: DISCOMFORT
DESCRIPTORS: DISCOMFORT
DESCRIPTORS: CRAMPING;DISCOMFORT
DESCRIPTORS: PRESSURE
DESCRIPTORS: DISCOMFORT
DESCRIPTORS: PRESSURE
DESCRIPTORS: HEADACHE;DISCOMFORT

## 2019-07-20 ASSESSMENT — PAIN SCALES - GENERAL: PAINLEVEL_OUTOF10: 0

## 2019-07-21 LAB
A/G RATIO: 0.7 (ref 1.1–2.2)
ALBUMIN SERPL-MCNC: 1.9 G/DL (ref 3.4–5)
ALP BLD-CCNC: 123 U/L (ref 40–129)
ALT SERPL-CCNC: 17 U/L (ref 10–40)
ANION GAP SERPL CALCULATED.3IONS-SCNC: 13 MMOL/L (ref 3–16)
AST SERPL-CCNC: 25 U/L (ref 15–37)
BASOPHILS ABSOLUTE: 0 K/UL (ref 0–0.2)
BASOPHILS RELATIVE PERCENT: 0.3 %
BILIRUB SERPL-MCNC: <0.2 MG/DL (ref 0–1)
BILIRUBIN DIRECT: <0.2 MG/DL (ref 0–0.3)
BILIRUBIN, INDIRECT: NORMAL MG/DL (ref 0–1)
BUN BLDV-MCNC: 10 MG/DL (ref 7–20)
CALCIUM SERPL-MCNC: 6.1 MG/DL (ref 8.3–10.6)
CHLORIDE BLD-SCNC: 102 MMOL/L (ref 99–110)
CO2: 17 MMOL/L (ref 21–32)
CREAT SERPL-MCNC: 0.6 MG/DL (ref 0.6–1.1)
EOSINOPHILS ABSOLUTE: 0 K/UL (ref 0–0.6)
EOSINOPHILS RELATIVE PERCENT: 0.2 %
GFR AFRICAN AMERICAN: >60
GFR NON-AFRICAN AMERICAN: >60
GLOBULIN: 2.8 G/DL
GLUCOSE BLD-MCNC: 84 MG/DL (ref 70–99)
HCT VFR BLD CALC: 29 % (ref 36–48)
HEMOGLOBIN: 9.7 G/DL (ref 12–16)
LYMPHOCYTES ABSOLUTE: 1.2 K/UL (ref 1–5.1)
LYMPHOCYTES RELATIVE PERCENT: 9.6 %
MCH RBC QN AUTO: 30 PG (ref 26–34)
MCHC RBC AUTO-ENTMCNC: 33.4 G/DL (ref 31–36)
MCV RBC AUTO: 89.8 FL (ref 80–100)
MONOCYTES ABSOLUTE: 1.1 K/UL (ref 0–1.3)
MONOCYTES RELATIVE PERCENT: 9.4 %
NEUTROPHILS ABSOLUTE: 9.7 K/UL (ref 1.7–7.7)
NEUTROPHILS RELATIVE PERCENT: 80.5 %
PDW BLD-RTO: 14 % (ref 12.4–15.4)
PLATELET # BLD: 220 K/UL (ref 135–450)
PMV BLD AUTO: 10.1 FL (ref 5–10.5)
POTASSIUM SERPL-SCNC: 4.2 MMOL/L (ref 3.5–5.1)
RBC # BLD: 3.23 M/UL (ref 4–5.2)
SODIUM BLD-SCNC: 132 MMOL/L (ref 136–145)
TOTAL PROTEIN: 4.7 G/DL (ref 6.4–8.2)
WBC # BLD: 12.1 K/UL (ref 4–11)

## 2019-07-21 PROCEDURE — 82248 BILIRUBIN DIRECT: CPT

## 2019-07-21 PROCEDURE — 2580000003 HC RX 258: Performed by: OBSTETRICS & GYNECOLOGY

## 2019-07-21 PROCEDURE — 6370000000 HC RX 637 (ALT 250 FOR IP): Performed by: OBSTETRICS & GYNECOLOGY

## 2019-07-21 PROCEDURE — 1220000000 HC SEMI PRIVATE OB R&B

## 2019-07-21 PROCEDURE — 80053 COMPREHEN METABOLIC PANEL: CPT

## 2019-07-21 PROCEDURE — 99232 SBSQ HOSP IP/OBS MODERATE 35: CPT | Performed by: OBSTETRICS & GYNECOLOGY

## 2019-07-21 PROCEDURE — 6360000002 HC RX W HCPCS: Performed by: OBSTETRICS & GYNECOLOGY

## 2019-07-21 PROCEDURE — 85025 COMPLETE CBC W/AUTO DIFF WBC: CPT

## 2019-07-21 RX ADMIN — IBUPROFEN 800 MG: 400 TABLET ORAL at 04:12

## 2019-07-21 RX ADMIN — LABETALOL HYDROCHLORIDE 200 MG: 200 TABLET, FILM COATED ORAL at 08:36

## 2019-07-21 RX ADMIN — IBUPROFEN 800 MG: 400 TABLET ORAL at 22:44

## 2019-07-21 RX ADMIN — DOCUSATE SODIUM 100 MG: 100 CAPSULE, LIQUID FILLED ORAL at 08:36

## 2019-07-21 RX ADMIN — MAGNESIUM SULFATE IN WATER 2 G/HR: 40 INJECTION, SOLUTION INTRAVENOUS at 04:10

## 2019-07-21 RX ADMIN — MISOPROSTOL 100 MCG: 100 TABLET ORAL at 08:37

## 2019-07-21 RX ADMIN — SODIUM CHLORIDE, POTASSIUM CHLORIDE, SODIUM LACTATE AND CALCIUM CHLORIDE: 600; 310; 30; 20 INJECTION, SOLUTION INTRAVENOUS at 10:02

## 2019-07-21 RX ADMIN — LABETALOL HYDROCHLORIDE 200 MG: 200 TABLET, FILM COATED ORAL at 22:44

## 2019-07-21 RX ADMIN — DOCUSATE SODIUM 100 MG: 100 CAPSULE, LIQUID FILLED ORAL at 22:44

## 2019-07-21 RX ADMIN — BENZOCAINE AND LEVOMENTHOL: 200; 5 SPRAY TOPICAL at 08:37

## 2019-07-21 RX ADMIN — IBUPROFEN 800 MG: 400 TABLET ORAL at 11:35

## 2019-07-21 ASSESSMENT — PAIN DESCRIPTION - LOCATION
LOCATION: ABDOMEN
LOCATION: BACK
LOCATION: ABDOMEN
LOCATION: ABDOMEN

## 2019-07-21 ASSESSMENT — PAIN DESCRIPTION - PROGRESSION
CLINICAL_PROGRESSION: NOT CHANGED
CLINICAL_PROGRESSION: RAPIDLY IMPROVING

## 2019-07-21 ASSESSMENT — PAIN SCALES - GENERAL
PAINLEVEL_OUTOF10: 6
PAINLEVEL_OUTOF10: 0
PAINLEVEL_OUTOF10: 3
PAINLEVEL_OUTOF10: 7
PAINLEVEL_OUTOF10: 5
PAINLEVEL_OUTOF10: 6

## 2019-07-21 ASSESSMENT — PAIN - FUNCTIONAL ASSESSMENT
PAIN_FUNCTIONAL_ASSESSMENT: ACTIVITIES ARE NOT PREVENTED

## 2019-07-21 ASSESSMENT — PAIN DESCRIPTION - PAIN TYPE
TYPE: ACUTE PAIN

## 2019-07-21 ASSESSMENT — PAIN DESCRIPTION - ORIENTATION
ORIENTATION: LOWER

## 2019-07-21 ASSESSMENT — PAIN DESCRIPTION - ONSET
ONSET: GRADUAL

## 2019-07-21 ASSESSMENT — PAIN DESCRIPTION - DESCRIPTORS
DESCRIPTORS: CRAMPING
DESCRIPTORS: SORE;ACHING
DESCRIPTORS: CRAMPING
DESCRIPTORS: CRAMPING
DESCRIPTORS: CRAMPING;DISCOMFORT

## 2019-07-21 ASSESSMENT — PAIN DESCRIPTION - FREQUENCY
FREQUENCY: INTERMITTENT

## 2019-07-22 VITALS
RESPIRATION RATE: 16 BRPM | HEART RATE: 78 BPM | OXYGEN SATURATION: 100 % | DIASTOLIC BLOOD PRESSURE: 84 MMHG | SYSTOLIC BLOOD PRESSURE: 136 MMHG | TEMPERATURE: 98 F

## 2019-07-22 PROCEDURE — 99999 PR OFFICE/OUTPT VISIT,PROCEDURE ONLY: CPT | Performed by: OBSTETRICS & GYNECOLOGY

## 2019-07-22 PROCEDURE — 6370000000 HC RX 637 (ALT 250 FOR IP): Performed by: OBSTETRICS & GYNECOLOGY

## 2019-07-22 RX ORDER — LABETALOL 200 MG/1
200 TABLET, FILM COATED ORAL EVERY 12 HOURS SCHEDULED
Qty: 60 TABLET | Refills: 0 | Status: SHIPPED | OUTPATIENT
Start: 2019-07-22 | End: 2019-10-28 | Stop reason: ALTCHOICE

## 2019-07-22 RX ADMIN — DOCUSATE SODIUM 100 MG: 100 CAPSULE, LIQUID FILLED ORAL at 08:32

## 2019-07-22 RX ADMIN — IBUPROFEN 800 MG: 400 TABLET ORAL at 05:01

## 2019-07-22 RX ADMIN — LABETALOL HYDROCHLORIDE 200 MG: 200 TABLET, FILM COATED ORAL at 08:32

## 2019-07-22 RX ADMIN — HYDROCODONE BITARTRATE AND ACETAMINOPHEN 1 TABLET: 5; 325 TABLET ORAL at 00:51

## 2019-07-22 ASSESSMENT — PAIN DESCRIPTION - DESCRIPTORS
DESCRIPTORS: CRAMPING

## 2019-07-22 ASSESSMENT — PAIN SCALES - GENERAL
PAINLEVEL_OUTOF10: 5
PAINLEVEL_OUTOF10: 4
PAINLEVEL_OUTOF10: 5
PAINLEVEL_OUTOF10: 1

## 2019-07-22 ASSESSMENT — PAIN DESCRIPTION - PROGRESSION
CLINICAL_PROGRESSION: GRADUALLY IMPROVING
CLINICAL_PROGRESSION: NOT CHANGED
CLINICAL_PROGRESSION: GRADUALLY IMPROVING
CLINICAL_PROGRESSION: NOT CHANGED
CLINICAL_PROGRESSION: GRADUALLY IMPROVING
CLINICAL_PROGRESSION: GRADUALLY IMPROVING

## 2019-07-22 ASSESSMENT — PAIN DESCRIPTION - FREQUENCY
FREQUENCY: INTERMITTENT

## 2019-07-22 ASSESSMENT — PAIN - FUNCTIONAL ASSESSMENT
PAIN_FUNCTIONAL_ASSESSMENT: ACTIVITIES ARE NOT PREVENTED

## 2019-07-22 ASSESSMENT — PAIN DESCRIPTION - PAIN TYPE
TYPE: ACUTE PAIN

## 2019-07-22 ASSESSMENT — PAIN DESCRIPTION - LOCATION
LOCATION: ABDOMEN

## 2019-07-22 ASSESSMENT — PAIN DESCRIPTION - ORIENTATION
ORIENTATION: LOWER

## 2019-07-22 ASSESSMENT — PAIN DESCRIPTION - ONSET
ONSET: GRADUAL

## 2019-07-23 LAB
ORGANISM: ABNORMAL
URINE CULTURE, ROUTINE: ABNORMAL
URINE CULTURE, ROUTINE: ABNORMAL

## 2019-07-24 LAB
Lab: NORMAL
REPORT: NORMAL
THIS TEST SENT TO: NORMAL

## 2019-07-26 ENCOUNTER — OFFICE VISIT (OUTPATIENT)
Dept: INTERNAL MEDICINE CLINIC | Age: 24
End: 2019-07-26
Payer: MEDICAID

## 2019-07-26 VITALS
WEIGHT: 123 LBS | HEIGHT: 65 IN | BODY MASS INDEX: 20.49 KG/M2 | HEART RATE: 86 BPM | OXYGEN SATURATION: 98 % | RESPIRATION RATE: 17 BRPM | DIASTOLIC BLOOD PRESSURE: 88 MMHG | SYSTOLIC BLOOD PRESSURE: 131 MMHG

## 2019-07-26 DIAGNOSIS — Z87.59 HISTORY OF PRE-ECLAMPSIA: ICD-10-CM

## 2019-07-26 DIAGNOSIS — R03.0 ELEVATED BLOOD PRESSURE READING: Primary | ICD-10-CM

## 2019-07-26 PROCEDURE — G8427 DOCREV CUR MEDS BY ELIG CLIN: HCPCS | Performed by: NURSE PRACTITIONER

## 2019-07-26 PROCEDURE — G8420 CALC BMI NORM PARAMETERS: HCPCS | Performed by: NURSE PRACTITIONER

## 2019-07-26 PROCEDURE — 1111F DSCHRG MED/CURRENT MED MERGE: CPT | Performed by: NURSE PRACTITIONER

## 2019-07-26 PROCEDURE — 99203 OFFICE O/P NEW LOW 30 MIN: CPT | Performed by: NURSE PRACTITIONER

## 2019-07-26 PROCEDURE — 1036F TOBACCO NON-USER: CPT | Performed by: NURSE PRACTITIONER

## 2019-07-26 ASSESSMENT — ENCOUNTER SYMPTOMS
RHINORRHEA: 0
SHORTNESS OF BREATH: 0
COLOR CHANGE: 0
DIARRHEA: 0
SORE THROAT: 0
EYE REDNESS: 0
EYE DISCHARGE: 0
CONSTIPATION: 0
BACK PAIN: 0
COUGH: 0

## 2019-07-26 ASSESSMENT — PATIENT HEALTH QUESTIONNAIRE - PHQ9
1. LITTLE INTEREST OR PLEASURE IN DOING THINGS: 0
2. FEELING DOWN, DEPRESSED OR HOPELESS: 0
SUM OF ALL RESPONSES TO PHQ QUESTIONS 1-9: 0
SUM OF ALL RESPONSES TO PHQ9 QUESTIONS 1 & 2: 0
SUM OF ALL RESPONSES TO PHQ QUESTIONS 1-9: 0

## 2019-07-31 ENCOUNTER — OFFICE VISIT (OUTPATIENT)
Dept: INTERNAL MEDICINE CLINIC | Age: 24
End: 2019-07-31
Payer: MEDICAID

## 2019-07-31 VITALS
SYSTOLIC BLOOD PRESSURE: 121 MMHG | HEART RATE: 70 BPM | OXYGEN SATURATION: 98 % | BODY MASS INDEX: 19.13 KG/M2 | DIASTOLIC BLOOD PRESSURE: 84 MMHG | WEIGHT: 113.2 LBS | RESPIRATION RATE: 17 BRPM

## 2019-07-31 DIAGNOSIS — R03.0 ELEVATED BLOOD PRESSURE READING: Primary | ICD-10-CM

## 2019-07-31 PROBLEM — F32.A DEPRESSION: Status: ACTIVE | Noted: 2018-04-17

## 2019-07-31 PROCEDURE — 99214 OFFICE O/P EST MOD 30 MIN: CPT | Performed by: NURSE PRACTITIONER

## 2019-07-31 PROCEDURE — 1111F DSCHRG MED/CURRENT MED MERGE: CPT | Performed by: NURSE PRACTITIONER

## 2019-07-31 PROCEDURE — G8420 CALC BMI NORM PARAMETERS: HCPCS | Performed by: NURSE PRACTITIONER

## 2019-07-31 PROCEDURE — 96160 PT-FOCUSED HLTH RISK ASSMT: CPT | Performed by: NURSE PRACTITIONER

## 2019-07-31 PROCEDURE — G8427 DOCREV CUR MEDS BY ELIG CLIN: HCPCS | Performed by: NURSE PRACTITIONER

## 2019-07-31 PROCEDURE — 1036F TOBACCO NON-USER: CPT | Performed by: NURSE PRACTITIONER

## 2019-07-31 RX ORDER — CITALOPRAM 10 MG/1
10 TABLET ORAL DAILY
Qty: 30 TABLET | Refills: 3 | Status: SHIPPED | OUTPATIENT
Start: 2019-07-31

## 2019-07-31 ASSESSMENT — PATIENT HEALTH QUESTIONNAIRE - PHQ9
1. LITTLE INTEREST OR PLEASURE IN DOING THINGS: 2
SUM OF ALL RESPONSES TO PHQ QUESTIONS 1-9: 22
SUM OF ALL RESPONSES TO PHQ9 QUESTIONS 1 & 2: 4
SUM OF ALL RESPONSES TO PHQ QUESTIONS 1-9: 22
3. TROUBLE FALLING OR STAYING ASLEEP: 2
6. FEELING BAD ABOUT YOURSELF - OR THAT YOU ARE A FAILURE OR HAVE LET YOURSELF OR YOUR FAMILY DOWN: 2
8. MOVING OR SPEAKING SO SLOWLY THAT OTHER PEOPLE COULD HAVE NOTICED. OR THE OPPOSITE, BEING SO FIGETY OR RESTLESS THAT YOU HAVE BEEN MOVING AROUND A LOT MORE THAN USUAL: 3
2. FEELING DOWN, DEPRESSED OR HOPELESS: 2
5. POOR APPETITE OR OVEREATING: 3
7. TROUBLE CONCENTRATING ON THINGS, SUCH AS READING THE NEWSPAPER OR WATCHING TELEVISION: 3
9. THOUGHTS THAT YOU WOULD BE BETTER OFF DEAD, OR OF HURTING YOURSELF: 2
10. IF YOU CHECKED OFF ANY PROBLEMS, HOW DIFFICULT HAVE THESE PROBLEMS MADE IT FOR YOU TO DO YOUR WORK, TAKE CARE OF THINGS AT HOME, OR GET ALONG WITH OTHER PEOPLE: 3
4. FEELING TIRED OR HAVING LITTLE ENERGY: 3

## 2019-07-31 ASSESSMENT — ENCOUNTER SYMPTOMS: COUGH: 0

## 2019-07-31 NOTE — PROGRESS NOTES
07/21/2019 4.2     Chloride 07/21/2019 102     CO2 07/21/2019 17*    Anion Gap 07/21/2019 13     Glucose 07/21/2019 84     BUN 07/21/2019 10     CREATININE 07/21/2019 0.6     GFR Non- 07/21/2019 >60     GFR  07/21/2019 >60     Calcium 07/21/2019 6.1*    Total Protein 07/21/2019 4.7*    Alb 07/21/2019 1.9*    Albumin/Globulin Ratio 07/21/2019 0.7*    Total Bilirubin 07/21/2019 <0.2     Alkaline Phosphatase 07/21/2019 123     ALT 07/21/2019 17     AST 07/21/2019 25     Globulin 07/21/2019 2.8     Bilirubin, Direct 07/21/2019 <0.2     Bilirubin, Indirect 07/21/2019 see below     Color, UA 07/20/2019 NORRIS*    Clarity, UA 07/20/2019 TURBID*    Glucose, Ur 07/20/2019 Negative     Bilirubin Urine 07/20/2019 Negative     Ketones, Urine 07/20/2019 TRACE*    Specific Mathis, UA 07/20/2019 1.023     Blood, Urine 07/20/2019 Negative     pH, UA 07/20/2019 5.5     Protein, UA 07/20/2019 >=300*    Urobilinogen, Urine 07/20/2019 0.2     Nitrite, Urine 07/20/2019 Negative     Leukocyte Esterase, Urine 07/20/2019 SMALL*    Microscopic Examination 07/20/2019 YES     Urine Reflex to Culture 07/20/2019 Yes     Urine Type 07/20/2019 Not Specified     Organism 07/20/2019 Staphylococcus epidermidis*    Urine Culture, Routine 07/20/2019 50,000 CFU/ml     RBC, UA 07/20/2019 >100*    Urinalysis Comments 07/20/2019 see below     Hyaline Casts, UA 07/20/2019 8     WBC, UA 07/20/2019 5     Epi Cells 07/20/2019 2     test code 07/20/2019 drug test     This Test Sent To 07/20/2019 [address]     Report 07/20/2019 SEE [facility] Outpatient Visit on 07/19/2019   Component Date Value    Color, UA 07/19/2019 Yellow     Clarity, UA 07/19/2019 CLOUDY*    Glucose, Ur 07/19/2019 Negative     Bilirubin Urine 07/19/2019 SMALL*    Ketones, Urine 07/19/2019 TRACE*    Specific Mathis, UA 07/19/2019 1.025     Blood, Urine 07/19/2019 MODERATE*    pH, UA 07/19/2019 6.5     06/18/2019 Neg     Benzodiazepine Screen, U* 06/18/2019 Neg     Cannabinoid Scrn, Ur 06/18/2019 Neg     Cocaine Metabolite Scree* 06/18/2019 Neg     Opiate Scrn, Ur 06/18/2019 Neg     PCP Screen, Urine 06/18/2019 Neg     Methadone Screen, Urine 06/18/2019 Neg     Propoxyphene Scrn, Ur 06/18/2019 Neg     Oxycodone Urine 06/18/2019 Neg     Buprenorphine Urine 06/18/2019 Neg     pH, UA 06/18/2019 5.0     Drug Screen Comment: 06/18/2019 see below    Hospital Outpatient Visit on 06/07/2019   Component Date Value    Color, UA 06/07/2019 Yellow     Clarity, UA 06/07/2019 Clear     Glucose, Ur 06/07/2019 Negative     Bilirubin Urine 06/07/2019 Negative     Ketones, Urine 06/07/2019 Negative     Specific Gravity, UA 06/07/2019 1.015     Blood, Urine 06/07/2019 Negative     pH, UA 06/07/2019 7.0     Protein, UA 06/07/2019 TRACE*    Urobilinogen, Urine 06/07/2019 0.2     Nitrite, Urine 06/07/2019 Negative     Leukocyte Esterase, Urine 06/07/2019 Negative        Physical Exam   Cardiovascular: Normal rate and regular rhythm. Pulmonary/Chest: Effort normal and breath sounds normal.   Psychiatric: Her speech is normal and behavior is normal. Thought content normal. She exhibits a depressed mood. ASSESSMENT/PLAN:    [place] was seen today for other and depression. Diagnoses and all orders for this visit:    Elevated blood pressure reading  -Controlled; patient's blood pressure stable, will continue to monitor, no medication at this time. Post partum depression  -     citalopram (CELEXA) 10 MG tablet; Take 1 tablet by mouth daily       I have spent a total 25 minutes face-to-face with this patient and/or guardian. Over 50% of this time was spent on counseling and care coordination re: what is post partum depression, treatment, SE and benefits of medication, signs to look for that may indicate SI, patient to call mobile crisis line if feels like she want to harm herself or infant or others.

## 2019-08-14 ENCOUNTER — OFFICE VISIT (OUTPATIENT)
Dept: INTERNAL MEDICINE CLINIC | Age: 24
End: 2019-08-14
Payer: MEDICAID

## 2019-08-14 VITALS
DIASTOLIC BLOOD PRESSURE: 83 MMHG | RESPIRATION RATE: 16 BRPM | BODY MASS INDEX: 18.42 KG/M2 | SYSTOLIC BLOOD PRESSURE: 121 MMHG | OXYGEN SATURATION: 99 % | WEIGHT: 109 LBS | HEART RATE: 74 BPM

## 2019-08-14 PROCEDURE — 1036F TOBACCO NON-USER: CPT | Performed by: NURSE PRACTITIONER

## 2019-08-14 PROCEDURE — 99214 OFFICE O/P EST MOD 30 MIN: CPT | Performed by: NURSE PRACTITIONER

## 2019-08-14 PROCEDURE — 1111F DSCHRG MED/CURRENT MED MERGE: CPT | Performed by: NURSE PRACTITIONER

## 2019-08-14 PROCEDURE — G8428 CUR MEDS NOT DOCUMENT: HCPCS | Performed by: NURSE PRACTITIONER

## 2019-08-14 PROCEDURE — G8419 CALC BMI OUT NRM PARAM NOF/U: HCPCS | Performed by: NURSE PRACTITIONER

## 2019-08-14 RX ORDER — SKIN PROTECTANT 44 G/100G
OINTMENT TOPICAL 3 TIMES DAILY PRN
Qty: 454 G | Refills: 1 | Status: SHIPPED | OUTPATIENT
Start: 2019-08-14 | End: 2019-08-14 | Stop reason: CLARIF

## 2019-08-14 ASSESSMENT — ENCOUNTER SYMPTOMS: SHORTNESS OF BREATH: 0

## 2019-08-14 NOTE — PROGRESS NOTES
Cuff Size: Small Adult   Pulse: 74   Resp: 16   SpO2: 99%   Weight: 109 lb (49.4 kg)       BP Readings from Last 3 Encounters:   08/14/19 121/83   07/31/19 121/84   07/26/19 131/88        Lab Review   Admission on 07/19/2019, Discharged on 07/22/2019   Component Date Value    Sodium 07/19/2019 138     Potassium 07/19/2019 4.0     Chloride 07/19/2019 106     CO2 07/19/2019 20*    Anion Gap 07/19/2019 12     Glucose 07/19/2019 82     BUN 07/19/2019 8     CREATININE 07/19/2019 <0.5*    GFR Non- 07/19/2019 >60     GFR  07/19/2019 >60     Calcium 07/19/2019 8.5     Total Protein 07/19/2019 5.7*    Alb 07/19/2019 2.6*    Albumin/Globulin Ratio 07/19/2019 0.8*    Total Bilirubin 07/19/2019 <0.2     Alkaline Phosphatase 07/19/2019 162*    ALT 07/19/2019 15     AST 07/19/2019 25     Globulin 07/19/2019 3.1     WBC 07/19/2019 7.1     RBC 07/19/2019 3.60*    Hemoglobin 07/19/2019 10.8*    Hematocrit 07/19/2019 32.4*    MCV 07/19/2019 90.1     MCH 07/19/2019 30.1     MCHC 07/19/2019 33.4     RDW 07/19/2019 13.7     Platelets 68/83/4551 210     MPV 07/19/2019 10.4     Neutrophils % 07/19/2019 77.8     Lymphocytes % 07/19/2019 13.0     Monocytes % 07/19/2019 8.3     Eosinophils % 07/19/2019 0.5     Basophils % 07/19/2019 0.4     Neutrophils # 07/19/2019 5.6     Lymphocytes # 07/19/2019 0.9*    Monocytes # 07/19/2019 0.6     Eosinophils # 07/19/2019 0.0     Basophils # 07/19/2019 0.0     Protime 07/19/2019 9.7*    INR 07/19/2019 0.85*    Fibrinogen 07/19/2019 523*    Uric Acid, Serum 07/19/2019 5.3     Color, UA 07/19/2019 Yellow     Clarity, UA 07/19/2019 Clear     Glucose, Ur 07/19/2019 Negative     Bilirubin Urine 07/19/2019 Negative     Ketones, Urine 07/19/2019 Negative     Specific Gravity, UA 07/19/2019 1.020     Blood, Urine 07/19/2019 SMALL*    pH, UA 07/19/2019 6.5     Protein, UA 07/19/2019 >=300*    Urobilinogen, Urine 07/19/2019 1.0

## 2019-08-16 ASSESSMENT — ENCOUNTER SYMPTOMS
NAUSEA: 0
DIARRHEA: 0

## 2019-09-10 ENCOUNTER — HOSPITAL ENCOUNTER (OUTPATIENT)
Dept: OBGYN CLINIC | Age: 24
Discharge: HOME | End: 2019-09-10
Payer: MEDICAID

## 2019-09-10 VITALS
WEIGHT: 111.6 LBS | BODY MASS INDEX: 18.86 KG/M2 | DIASTOLIC BLOOD PRESSURE: 72 MMHG | SYSTOLIC BLOOD PRESSURE: 115 MMHG | HEART RATE: 58 BPM

## 2019-09-10 DIAGNOSIS — Z3A.09 9 WEEKS GESTATION OF PREGNANCY: ICD-10-CM

## 2019-09-10 PROCEDURE — 99212 OFFICE O/P EST SF 10 MIN: CPT

## 2019-09-10 RX ORDER — LEVONORGESTREL AND ETHINYL ESTRADIOL 0.1-0.02MG
1 KIT ORAL DAILY
Qty: 1 PACKET | Refills: 3 | Status: SHIPPED | OUTPATIENT
Start: 2019-09-10 | End: 2019-10-28

## 2019-09-10 NOTE — PROGRESS NOTES
abnormal  Date of last Pap smear: 18    Assessment:     Patient Active Problem List   Diagnosis Code    26 yo G1 Z3A.09    Chlamydia infection affecting pregnancy O98.819, A74.9    ASCUS of cervix with negative high risk HPV R87.610    Preeclampsia, third trimester O14.93    Depression F32.9    Migraine with aura G43. 109    Mild persistent asthma J45.30       27 y.o.       Family Planning: The patient is not sexually active. Full counseling on the many choices of family planning methods including OCP (estrogen/progesterone) was provided, and all questions answered. Compliance is strongly emphasized. Plan:         Airam Merrill was discharged from the Prenatal Clinic.                  Electronically signed by Miracle Monroy MD on 9/10/2019 at 2:55 PM

## 2019-09-25 NOTE — PLAN OF CARE
Prenatal Clinic  Clinical Level of [address].    NAME: Nia Myers  YOB: 1995 GENDER: female  MEDICAL RECORD NUMBER:  [id number]  DATE:  9/10/2019    Assessment   Points   No Assessment []   0   General Prenatal assessment (e.g. weight, vital signs, urine test). Completed OB Clinic navigator tabs, in partnership with the Registered Nurse. [x]   1   General Prenatal assessment (e.g. weight, vital signs, urine test). Completed OB Clinic navigator tabs, in partnership with the Registered Nurse. Set up tests or procedures (e.g. ultrasound, specimen swabs, induction). []   2   Full Prenatal assessment (e.g. weight, vitals signs, urine test) to include a full review of history. Completed OB Clinic navigator tabs, in partnership with the Registered Nurse. Set up tests or procedures (e.g. ultrasound, specimen swabs, induction). Or transfer to an outside facility, transfer to [facility] for further evaluation, admission to the hospital.  []   3       Ambulation Status   Status Definition Points   Independent Independently able to ambulate. Fully able (without any assistance) to get on/off exam table/chair. [x]   0   Minimal Physical Assistance Requires physical assistance of one person to ambulate and/or position patient to be examined. Includes necessary physical assistance to position lower extremities on/off stool. []   1   Moderate Physical Assistance Requires at least one staff member to physically assist patient in ambulating into treatment room, and on/off recliner chair. []   2   Full Assistance Requires assistance of at least two staff members to transfer patient into treatment room and/or on/off exam table/chair. \"Total Transfer\". []   3       Teaching Effort   Effort Definition Points   No Teaching  []   0   General The teaching will focus on visit schedule and laboratory tests. This education can be found in the Discharge Instructions.  [x]   1
plus additional topics such as health and lifestyle (e.g. diet). This education can be found in the Discharge Instructions. []   2   Complex New patient information packet given to the patient/caregiver and reviewed with the Registered Nurse. This education can be found in the Discharge Instructions. []   3       Patient Discharge and Planning  Planning Definition Points   General Seen for a postpartum visit. Gynecologist or [facility] Provider information given to patient, if needed. Discharge instructions and After Visit Summary given to patient/caregiver and reviewed with the Registered Nurse.     []   1   Intermediate Discharged from the Prenatal Clinic, to follow-up with a Gynecologist or Primary Care Provider. Discharge instructions and After Visit Summary given to patient/caregiver and reviewed with the Registered Nurse. Contact with additional resources (e.g. , Physician, Lactation). May include filling out forms, writing letters, communication with insurance , FMLA forms, etc.   [x]   2   Complex Full, comprehensive assessment and planning which includes assistance for a hospital admission or transfer to a higher level of care facility.   []   3     Is this the Patient's First Visit to the Prenatal Clinic    No     Is this Patient Established @ this [facility] [place]  Yes             Clinical Level of Care      Points  0-3  Level 1 []     Points  4-6  Level 2 [x]     Points  7-8  Level 3 []     Points  9-10  Level 4 []     Points  11-12  Level 5 []       Electronically signed by Bri Batres RN on 9/25/2019 at 2:01 PM

## 2019-10-21 ENCOUNTER — OFFICE VISIT (OUTPATIENT)
Dept: INTERNAL MEDICINE CLINIC | Age: 24
End: 2019-10-21
Payer: MEDICAID

## 2019-10-21 VITALS
BODY MASS INDEX: 18.42 KG/M2 | DIASTOLIC BLOOD PRESSURE: 79 MMHG | HEART RATE: 71 BPM | RESPIRATION RATE: 16 BRPM | WEIGHT: 109 LBS | SYSTOLIC BLOOD PRESSURE: 121 MMHG | OXYGEN SATURATION: 100 %

## 2019-10-21 DIAGNOSIS — J45.20 MILD INTERMITTENT ASTHMA WITHOUT COMPLICATION: ICD-10-CM

## 2019-10-21 DIAGNOSIS — Z30.09 BIRTH CONTROL COUNSELING: ICD-10-CM

## 2019-10-21 PROCEDURE — 1036F TOBACCO NON-USER: CPT | Performed by: NURSE PRACTITIONER

## 2019-10-21 PROCEDURE — G8419 CALC BMI OUT NRM PARAM NOF/U: HCPCS | Performed by: NURSE PRACTITIONER

## 2019-10-21 PROCEDURE — G8484 FLU IMMUNIZE NO ADMIN: HCPCS | Performed by: NURSE PRACTITIONER

## 2019-10-21 PROCEDURE — G8427 DOCREV CUR MEDS BY ELIG CLIN: HCPCS | Performed by: NURSE PRACTITIONER

## 2019-10-21 PROCEDURE — 99214 OFFICE O/P EST MOD 30 MIN: CPT | Performed by: NURSE PRACTITIONER

## 2019-10-23 ASSESSMENT — ENCOUNTER SYMPTOMS
NAUSEA: 0
COUGH: 0
VOMITING: 0
BACK PAIN: 0
SORE THROAT: 0

## 2019-11-11 ENCOUNTER — OFFICE VISIT (OUTPATIENT)
Dept: GYNECOLOGY | Age: 24
End: 2019-11-11
Payer: MEDICAID

## 2019-11-11 VITALS
OXYGEN SATURATION: 98 % | HEART RATE: 84 BPM | WEIGHT: 106.6 LBS | RESPIRATION RATE: 16 BRPM | BODY MASS INDEX: 17.76 KG/M2 | DIASTOLIC BLOOD PRESSURE: 72 MMHG | HEIGHT: 65 IN | SYSTOLIC BLOOD PRESSURE: 111 MMHG

## 2019-11-11 DIAGNOSIS — N89.8 VAGINAL DISCHARGE: Primary | ICD-10-CM

## 2019-11-11 PROCEDURE — G8484 FLU IMMUNIZE NO ADMIN: HCPCS | Performed by: OBSTETRICS & GYNECOLOGY

## 2019-11-11 PROCEDURE — G8427 DOCREV CUR MEDS BY ELIG CLIN: HCPCS | Performed by: OBSTETRICS & GYNECOLOGY

## 2019-11-11 PROCEDURE — G8419 CALC BMI OUT NRM PARAM NOF/U: HCPCS | Performed by: OBSTETRICS & GYNECOLOGY

## 2019-11-11 PROCEDURE — 99213 OFFICE O/P EST LOW 20 MIN: CPT | Performed by: OBSTETRICS & GYNECOLOGY

## 2019-11-11 PROCEDURE — 1036F TOBACCO NON-USER: CPT | Performed by: OBSTETRICS & GYNECOLOGY

## 2019-11-11 RX ORDER — MEDROXYPROGESTERONE ACETATE 150 MG/ML
150 INJECTION, SUSPENSION INTRAMUSCULAR
Qty: 1 ML | Refills: 3 | Status: SHIPPED | OUTPATIENT
Start: 2019-11-11

## 2019-11-12 LAB
CANDIDA SPECIES, DNA PROBE: ABNORMAL
GARDNERELLA VAGINALIS, DNA PROBE: ABNORMAL
TRICHOMONAS VAGINALIS DNA: ABNORMAL

## 2019-11-13 LAB
C TRACH DNA GENITAL QL NAA+PROBE: POSITIVE
N. GONORRHOEAE DNA: NEGATIVE

## 2019-11-13 RX ORDER — METRONIDAZOLE 500 MG/1
500 TABLET ORAL 2 TIMES DAILY
Qty: 14 TABLET | Refills: 0 | Status: SHIPPED | OUTPATIENT
Start: 2019-11-13 | End: 2019-11-20

## 2019-11-13 RX ORDER — AZITHROMYCIN 500 MG/1
1000 TABLET, FILM COATED ORAL ONCE
Qty: 1 TABLET | Refills: 0 | Status: SHIPPED | OUTPATIENT
Start: 2019-11-13 | End: 2019-11-13

## 2019-11-20 ENCOUNTER — OFFICE VISIT (OUTPATIENT)
Dept: INTERNAL MEDICINE CLINIC | Age: 24
End: 2019-11-20
Payer: MEDICAID

## 2019-11-20 VITALS
OXYGEN SATURATION: 96 % | BODY MASS INDEX: 17.74 KG/M2 | SYSTOLIC BLOOD PRESSURE: 112 MMHG | HEART RATE: 80 BPM | WEIGHT: 105 LBS | RESPIRATION RATE: 18 BRPM | DIASTOLIC BLOOD PRESSURE: 68 MMHG

## 2019-11-20 DIAGNOSIS — J45.20 MILD INTERMITTENT ASTHMA WITHOUT COMPLICATION: ICD-10-CM

## 2019-11-20 PROCEDURE — G8484 FLU IMMUNIZE NO ADMIN: HCPCS | Performed by: NURSE PRACTITIONER

## 2019-11-20 PROCEDURE — G8427 DOCREV CUR MEDS BY ELIG CLIN: HCPCS | Performed by: NURSE PRACTITIONER

## 2019-11-20 PROCEDURE — G8419 CALC BMI OUT NRM PARAM NOF/U: HCPCS | Performed by: NURSE PRACTITIONER

## 2019-11-20 PROCEDURE — 1036F TOBACCO NON-USER: CPT | Performed by: NURSE PRACTITIONER

## 2019-11-20 PROCEDURE — 99213 OFFICE O/P EST LOW 20 MIN: CPT | Performed by: NURSE PRACTITIONER

## 2019-11-20 RX ORDER — AZITHROMYCIN 500 MG/1
TABLET, FILM COATED ORAL
COMMUNITY
Start: 2019-11-19

## 2019-11-20 RX ORDER — ALBUTEROL SULFATE 90 UG/1
4 AEROSOL, METERED RESPIRATORY (INHALATION) EVERY 4 HOURS PRN
Qty: 1 INHALER | Refills: 2 | Status: SHIPPED | OUTPATIENT
Start: 2019-11-20 | End: 2020-06-18 | Stop reason: SDUPTHER

## 2019-11-22 ASSESSMENT — ENCOUNTER SYMPTOMS
RHINORRHEA: 0
NAUSEA: 0
VOMITING: 0
COUGH: 0

## 2020-06-17 NOTE — TELEPHONE ENCOUNTER
Pt called in stating that she needs a rescue inhaler. I told the pt that she needs a NTP appt. I made appt for pt but, she is stating that she needs inhaler asap . Please advise     Patient requesting a medication refill.   Medication: albuterol sulfate HFA      Dosage: 90 Base) MCG/ACT inhaler       Frequency:   Inhale 4 puffs into the lungs every 4 hours as needed for Wheezing              Last filled on: 11-20-19  Pharmacy: [address] [place] [address] [phone]  Next office visit: NTP 06/29/20  Last regular office visit: 11/20/19

## 2020-06-18 RX ORDER — ALBUTEROL SULFATE 90 UG/1
4 AEROSOL, METERED RESPIRATORY (INHALATION) EVERY 4 HOURS PRN
Qty: 1 INHALER | Refills: 2 | Status: SHIPPED | OUTPATIENT
Start: 2020-06-18

## 2021-12-24 DIAGNOSIS — J45.20 MILD INTERMITTENT ASTHMA WITHOUT COMPLICATION: ICD-10-CM

## 2021-12-27 RX ORDER — ALBUTEROL SULFATE 90 UG/1
4 AEROSOL, METERED RESPIRATORY (INHALATION) EVERY 4 HOURS PRN
OUTPATIENT
Start: 2021-12-27

## 2022-10-08 ENCOUNTER — HOSPITAL ENCOUNTER (EMERGENCY)
Age: 27
Discharge: HOME | End: 2022-10-08
Attending: EMERGENCY MEDICINE
Payer: MEDICAID

## 2022-10-08 ENCOUNTER — APPOINTMENT (OUTPATIENT)
Dept: ULTRASOUND IMAGING | Age: 27
End: 2022-10-08
Payer: MEDICAID

## 2022-10-08 VITALS
SYSTOLIC BLOOD PRESSURE: 108 MMHG | HEART RATE: 82 BPM | WEIGHT: 105.82 LBS | DIASTOLIC BLOOD PRESSURE: 71 MMHG | RESPIRATION RATE: 16 BRPM | BODY MASS INDEX: 17.88 KG/M2 | TEMPERATURE: 98.1 F | OXYGEN SATURATION: 100 %

## 2022-10-08 DIAGNOSIS — O03.9 SPONTANEOUS ABORTION: Primary | ICD-10-CM

## 2022-10-08 LAB
A/G RATIO: 2 (ref 1.1–2.2)
ABO/RH: NORMAL
ALBUMIN SERPL-MCNC: 4.9 G/DL (ref 3.4–5)
ALP BLD-CCNC: 52 U/L (ref 40–129)
ALT SERPL-CCNC: 10 U/L (ref 10–40)
ANION GAP SERPL CALCULATED.3IONS-SCNC: 11 MMOL/L (ref 3–16)
ANTIBODY SCREEN: NORMAL
AST SERPL-CCNC: 14 U/L (ref 15–37)
BASOPHILS ABSOLUTE: 0 K/UL (ref 0–0.2)
BASOPHILS RELATIVE PERCENT: 0.3 %
BILIRUB SERPL-MCNC: 0.6 MG/DL (ref 0–1)
BUN BLDV-MCNC: 7 MG/DL (ref 7–20)
CALCIUM SERPL-MCNC: 8.9 MG/DL (ref 8.3–10.6)
CHLORIDE BLD-SCNC: 101 MMOL/L (ref 99–110)
CO2: 23 MMOL/L (ref 21–32)
CREAT SERPL-MCNC: <0.5 MG/DL (ref 0.6–1.1)
EOSINOPHILS ABSOLUTE: 0 K/UL (ref 0–0.6)
EOSINOPHILS RELATIVE PERCENT: 0.4 %
GFR AFRICAN AMERICAN: >60
GFR NON-AFRICAN AMERICAN: >60
GLUCOSE BLD-MCNC: 89 MG/DL (ref 70–99)
GONADOTROPIN, CHORIONIC (HCG) QUANT: 1081 MIU/ML
HCT VFR BLD CALC: 38.6 % (ref 36–48)
HEMOGLOBIN: 13.2 G/DL (ref 12–16)
LYMPHOCYTES ABSOLUTE: 1.4 K/UL (ref 1–5.1)
LYMPHOCYTES RELATIVE PERCENT: 16.8 %
MCH RBC QN AUTO: 33.4 PG (ref 26–34)
MCHC RBC AUTO-ENTMCNC: 34.1 G/DL (ref 31–36)
MCV RBC AUTO: 97.8 FL (ref 80–100)
MONOCYTES ABSOLUTE: 0.6 K/UL (ref 0–1.3)
MONOCYTES RELATIVE PERCENT: 7.8 %
NEUTROPHILS ABSOLUTE: 6.1 K/UL (ref 1.7–7.7)
NEUTROPHILS RELATIVE PERCENT: 74.7 %
PDW BLD-RTO: 12.2 % (ref 12.4–15.4)
PLATELET # BLD: 205 K/UL (ref 135–450)
PMV BLD AUTO: 7.2 FL (ref 5–10.5)
POTASSIUM SERPL-SCNC: 3.6 MMOL/L (ref 3.5–5.1)
RBC # BLD: 3.95 M/UL (ref 4–5.2)
SODIUM BLD-SCNC: 135 MMOL/L (ref 136–145)
TOTAL PROTEIN: 7.4 G/DL (ref 6.4–8.2)
WBC # BLD: 8.2 K/UL (ref 4–11)

## 2022-10-08 PROCEDURE — 85025 COMPLETE CBC W/AUTO DIFF WBC: CPT

## 2022-10-08 PROCEDURE — 76817 TRANSVAGINAL US OBSTETRIC: CPT

## 2022-10-08 PROCEDURE — 80053 COMPREHEN METABOLIC PANEL: CPT

## 2022-10-08 PROCEDURE — 86850 RBC ANTIBODY SCREEN: CPT

## 2022-10-08 PROCEDURE — 86900 BLOOD TYPING SEROLOGIC ABO: CPT

## 2022-10-08 PROCEDURE — 99284 EMERGENCY DEPT VISIT MOD MDM: CPT

## 2022-10-08 PROCEDURE — 84702 CHORIONIC GONADOTROPIN TEST: CPT

## 2022-10-08 PROCEDURE — 36415 COLL VENOUS BLD VENIPUNCTURE: CPT

## 2022-10-08 PROCEDURE — 86901 BLOOD TYPING SEROLOGIC RH(D): CPT

## 2022-10-08 ASSESSMENT — PAIN DESCRIPTION - PAIN TYPE: TYPE: ACUTE PAIN

## 2022-10-08 ASSESSMENT — PAIN - FUNCTIONAL ASSESSMENT
PAIN_FUNCTIONAL_ASSESSMENT: 0-10
PAIN_FUNCTIONAL_ASSESSMENT: ACTIVITIES ARE NOT PREVENTED

## 2022-10-08 ASSESSMENT — PAIN DESCRIPTION - FREQUENCY: FREQUENCY: INTERMITTENT

## 2022-10-08 ASSESSMENT — PAIN DESCRIPTION - LOCATION: LOCATION: PELVIS

## 2022-10-08 ASSESSMENT — PAIN DESCRIPTION - DESCRIPTORS: DESCRIPTORS: CRAMPING

## 2022-10-08 ASSESSMENT — PAIN SCALES - GENERAL: PAINLEVEL_OUTOF10: 4

## 2022-10-08 NOTE — ED PROVIDER NOTES
[facility]  eMERGENCY dEPARTMENT eNCOUnter      Pt Name: Andry Bashir  MRN: [id number]  [place] 1995  Date of evaluation: 10/8/2022  Provider: Sharon Vidales MD    CHIEF COMPLAINT       Chief Complaint   Patient presents with    Vaginal Bleeding     Pt with intermittent vaginal bleeding, pt is pregnant but  unsure of how far along         CRITICAL CARE TIME   Total Critical Care time was 0 minutes, excluding separately reportable procedures. There was a high probability of clinically significant/life threatening deterioration in the patient's condition which required my urgent intervention. HISTORY OF PRESENT ILLNESS  (Location/Symptom, Timing/Onset, Context/Setting, Quality, Duration, Modifying Factors, Severity.)   Andry Bashir is a 24 y.o. female who presents to the emergency department ending of vaginal bleeding and cramping for about a week and a half. Patient is a  Ab0. Last normal menstrual period was 2022. She states she found out she was pregnant about 3 weeks ago. She had gone to pregnancy center [facility].  About a week and a half ago she started having some bleeding but no pain. She states she went to her gynecologist at [facility]". She states that they did an ultrasound but were not sure if they could see a definite pregnancy, may be a gestational sac. She states last night she developed some suprapubic cramping. The bleeding has not changed much. Nursing Notes were reviewed and I agree. REVIEW OF SYSTEMS    (2-9 systems for level 4, 10 or more for level 5)     ENT: Negative. Cardiovascular: No chest pain. Pulmonary: No shortness of breath or cough. GI: Suprapubic abdominal cramping that started last night. No nausea or vomiting. : Last menstrual period 2022. Vaginal bleeding for about a week and a half. She states that it is fairly light, some occasionally enough to soak a small pad.   No tissue or clots.  No frequency urgency or dysuria. No other vaginal discharge. Neuro: No dizziness or passing out. Except as noted above the remainder of the review of systems was reviewed and negative. PAST MEDICAL HISTORY     Past Medical History:   Diagnosis Date    ADHD     started meds at age 17 yo    Asthma     Depression     Headache     possible migraine hx, about 17years of age    Hip fracture ([employer] [facility] [address]     right hip stress fx 2016    Varicella          SURGICAL HISTORY     History reviewed. No pertinent surgical history. CURRENT MEDICATIONS       Previous Medications    ALBUTEROL SULFATE  (90 BASE) MCG/ACT INHALER    Inhale 4 puffs into the lungs every 4 hours as needed for Wheezing    AZITHROMYCIN (ZITHROMAX) 500 MG TABLET        CITALOPRAM (CELEXA) 10 MG TABLET    Take 1 tablet by mouth daily    MEDROXYPROGESTERONE (DEPO-PROVERA) 150 MG/ML INJECTION    Inject 1 mL into the muscle every 3 months    SPACER/AERO-HOLDING CHAMBERS (E-Z SPACER) ANUSHA    1 Device by Does not apply route daily as needed (as needed with inhaler)       ALLERGIES     Patient has no known allergies.     FAMILY HISTORY       Family History   Problem Relation Age of Onset    Mental Illness Mother     Other Mother         thyroid removed    Diabetes Father     Asthma Brother     ADHD Brother     Breast Cancer Maternal Grandmother     ADHD Brother     Birth Defects Maternal Cousin           SOCIAL HISTORY       Social History     Socioeconomic History    Marital status: Single     Spouse name: None    Number of children: None    Years of education: None    Highest education level: None   Occupational History    Occupation: retail   Tobacco Use    Smoking status: Former     Packs/day: 1.00     Types: Cigarettes     Start date: 6/27/2018     Quit date: 11/27/2018     Years since quitting: 3.8    Smokeless tobacco: Never   Vaping Use    Vaping Use: Never used   Substance and Sexual Activity    Alcohol use: No     Comment: Prior to pregnancy    Drug use: No     Types: Marijuana Shellye Burkitt)     Comment: 1-2 times monthly - prior to pregnancy    Sexual activity: Not Currently     Partners: Male         PHYSICAL EXAM    (up to 7 for level 4, 8 or more for level 5)     ED Triage Vitals   BP Temp Temp src Pulse Resp SpO2 Height Weight   -- -- -- -- -- -- -- --       General:  Alert thin black female in no acute distress. Head: Atraumatic and normocephalic. Eyes: No conjunctival injection. No pallor. Pupils equal round reactive. Neck: Supple, nontender. Heart: Regular rate and rhythm. No murmurs or gallops noted. Lungs: Breath sounds equal bilaterally and clear. Abdomen: Scaphoid, soft, nontender. No masses organomegaly. Bowel sounds are normal.  Pelvic exam external genitalia is normal.  Vaginal vault contains a small amount of bright red blood. The os is closed. There is no tissue. Uterus is not palpably enlarged. There is no adnexal tenderness or masses. Skin: Warm and dry, good turgor. No pallor or cyanosis. No diaphoresis. Neuro: Awake, alert, oriented. No focal motor deficits. DIFFERENTIAL DIAGNOSIS   Differential includes but is not limited to threatened , incomplete , complete , ectopic pregnancy, cervicitis, other. DIAGNOSTIC RESULTS     EKG: All EKG's are interpreted by Erika Kennedy MD in the absence of a cardiologist.      RADIOLOGY:   Non-plain film images such as CT, Ultrasound and MRI are read by the radiologist. Plain radiographic images are visualized and preliminarily interpreted Erika Kennedy MD with the below findings:      Interpretation per the Radiologist below, if available at the time of this note:    [address].   Final Result   No intrauterine pregnancy. Complex 1.7 cm adnexal cyst.  Degenerating cyst versus ectopic pregnancy.    There is lack of vascularity, which favors degenerating cyst.  Continued   clinical and laboratory surveillance is recommended. ED BEDSIDE ULTRASOUND:   Performed by ED Physician - none    LABS:  Labs Reviewed   CBC WITH AUTO DIFFERENTIAL - Abnormal; Notable for the following components:       Result Value    RBC 3.95 (*)     RDW 12.2 (*)     All other components within normal limits   COMPREHENSIVE METABOLIC PANEL - Abnormal; Notable for the following components:    Sodium 135 (*)     Creatinine <0.5 (*)     AST 14 (*)     All other components within normal limits   C.TRACHOMATIS N.GONORRHOEAE DNA   WET PREP, GENITAL   HCG, QUANTITATIVE, PREGNANCY   TYPE AND SCREEN       All other labs were within normal range or not returned as of this dictation. EMERGENCY DEPARTMENT COURSE and DIFFERENTIAL DIAGNOSIS/MDM:   Vitals:    Vitals:    10/08/22 1120 10/08/22 1325   BP: 107/69 108/71   Pulse: 83 82   Resp: 18 16   Temp: 98 °F (36.7 °C) 98.1 °F (36.7 °C)   TempSrc: Oral Oral   SpO2: 100% 100%   Weight: 105 lb 13.1 oz (48 kg)        Patient presents with vaginal bleeding for a week and a half. Her last normal menstrual period was 8/18/2022. She has been seen twice as noted above. Her pregnancy was confirmed. She states her most recent visit to her gynecologist they did an ultrasound. From her description it sounds like they question whether they saw a sac but could not definitely confirm her pregnancy. She has had bleeding for the last week and a half. She states it has not been really heavy, but it times could soak a small pad or panty liner. No clots or tissue. She is having some true suprapubic cramping. Her H&H is normal.  Her renal function is normal.  Her quantitative hCG is 1081. Her pelvic ultrasound shows no evidence of an IUP. She has a 1.7 cm adnexal cyst right. Per radiology it favors a degenerating cyst as opposed to an ectopic pregnancy. Ectopic pregnancy cannot be completely ruled out. She has no significant abdominal tenderness.   She has no significant tenderness on pelvic exam. Examination is certainly not consistent with a ruptured ectopic pregnancy. I think she can be followed as an outpatient. This most likely represents a spontaneous  with an adnexal cyst.  She will need close follow-up with her gynecologist early next week. She will need repeat quantitative hCG. I discussed with the patient her ultrasound findings and the significance of these findings. I discussed with her specifically the cyst noted on the right and the fact that we could not completely rule out a tubal pregnancy. I explained to her that she should return for any increasing abdominal pain or increased vaginal bleeding, otherwise she needs close follow-up with her gynecologist for reevaluation and repeat quantitative hCG. Test results, diagnosis, and treatment plan were discussed with the patient. She understands her treatment plan follow-up as discussed        CONSULTS:  None    PROCEDURES:  None    FINAL IMPRESSION      1.  Spontaneous           DISPOSITION/PLAN   DISPOSITION Decision To Discharge 10/08/2022 01:48:55 PM      PATIENT REFERRED TO:  [facility]    Schedule an appointment as soon as possible for a visit in 2 days  Recheck and repeat blood pregnancy test    DISCHARGE MEDICATIONS:  New Prescriptions    No medications on file       (Please note that portions of this note were completed with a voice recognition program.  Efforts were made to edit the dictations but occasionally words are mis-transcribed.)    Serina Hobson MD  Attending Emergency Physician        Vasquez Fitzgerald MD  10/08/22 [id number]

## 2022-10-08 NOTE — DISCHARGE INSTRUCTIONS
Discussed it is important that you contact your OB/GYN on Monday to arrange follow-up early next week. You will need to be rechecked and you will need a repeat blood pregnancy test.    Return as needed for heavy vaginal bleeding or increased abdominal pain. You can take Tylenol every 6 hours as needed for cramping.

## 2022-10-08 NOTE — ED NOTES
RN to bedside to assist MD with vaginal exam.  Pt up to bathroom to self swab, RN gave instructions on self-swab, but pt did not do swabs correct. Pt refuses to self swab again. Pt given discharge instructions. D/C: Order noted for d/c. Pt confirmed d/c paperwork  have correct name. Discharge and education instructions reviewed with patient. Teach-back successful. Pt verbalized understanding and signed d/c papers. Pt denied questions at this time. No acute distress noted. Patient instructed to follow-up as noted - return to emergency department if symptoms worsen. Patient verbalized understanding. Discharged per EDMD with discharge instructions. Pt discharged to private vehicle. Patient stable upon departure. Thanked patient for choosing [facility]) for care. Provider aware of patient pain at time of discharge.        Srinivasa Lopez RN  10/08/22 [id number]

## 2023-08-04 ENCOUNTER — OFFICE VISIT (OUTPATIENT)
Dept: FAMILY MEDICINE CLINIC | Age: 28
End: 2023-08-04

## 2023-08-04 VITALS
HEART RATE: 96 BPM | SYSTOLIC BLOOD PRESSURE: 91 MMHG | DIASTOLIC BLOOD PRESSURE: 62 MMHG | WEIGHT: 99.6 LBS | OXYGEN SATURATION: 97 % | TEMPERATURE: 98.2 F | HEIGHT: 65 IN | BODY MASS INDEX: 16.6 KG/M2

## 2023-08-04 DIAGNOSIS — J45.20 MILD INTERMITTENT ASTHMA WITHOUT COMPLICATION: ICD-10-CM

## 2023-08-04 DIAGNOSIS — Z00.00 WELL ADULT EXAM: Primary | ICD-10-CM

## 2023-08-04 PROBLEM — Z3A.09 9 WEEKS GESTATION OF PREGNANCY: Status: RESOLVED | Noted: 2018-12-27 | Resolved: 2023-08-04

## 2023-08-04 PROBLEM — O98.819 CHLAMYDIA INFECTION AFFECTING PREGNANCY: Status: RESOLVED | Noted: 2019-01-21 | Resolved: 2023-08-04

## 2023-08-04 PROBLEM — A74.9 CHLAMYDIA INFECTION AFFECTING PREGNANCY: Status: RESOLVED | Noted: 2019-01-21 | Resolved: 2023-08-04

## 2023-08-04 RX ORDER — ALBUTEROL SULFATE 90 UG/1
4 AEROSOL, METERED RESPIRATORY (INHALATION) EVERY 4 HOURS PRN
Qty: 1 EACH | Refills: 2 | Status: SHIPPED | OUTPATIENT
Start: 2023-08-04

## 2023-08-04 RX ORDER — INHALER, ASSIST DEVICES
SPACER (EA) MISCELLANEOUS
Qty: 1 EACH | Refills: 0 | Status: SHIPPED | OUTPATIENT
Start: 2023-08-04

## 2023-08-04 SDOH — HEALTH STABILITY: PHYSICAL HEALTH: ON AVERAGE, HOW MANY MINUTES DO YOU ENGAGE IN EXERCISE AT THIS LEVEL?: 20 MIN

## 2023-08-04 SDOH — ECONOMIC STABILITY: HOUSING INSECURITY
IN THE LAST 12 MONTHS, WAS THERE A TIME WHEN YOU DID NOT HAVE A STEADY PLACE TO SLEEP OR SLEPT IN A SHELTER (INCLUDING NOW)?: YES

## 2023-08-04 SDOH — ECONOMIC STABILITY: FOOD INSECURITY: WITHIN THE PAST 12 MONTHS, YOU WORRIED THAT YOUR FOOD WOULD RUN OUT BEFORE YOU GOT MONEY TO BUY MORE.: SOMETIMES TRUE

## 2023-08-04 SDOH — ECONOMIC STABILITY: FOOD INSECURITY: WITHIN THE PAST 12 MONTHS, THE FOOD YOU BOUGHT JUST DIDN'T LAST AND YOU DIDN'T HAVE MONEY TO GET MORE.: SOMETIMES TRUE

## 2023-08-04 SDOH — ECONOMIC STABILITY: INCOME INSECURITY: HOW HARD IS IT FOR YOU TO PAY FOR THE VERY BASICS LIKE FOOD, HOUSING, MEDICAL CARE, AND HEATING?: SOMEWHAT HARD

## 2023-08-04 SDOH — HEALTH STABILITY: PHYSICAL HEALTH: ON AVERAGE, HOW MANY DAYS PER WEEK DO YOU ENGAGE IN MODERATE TO STRENUOUS EXERCISE (LIKE A BRISK WALK)?: 6 DAYS

## 2023-08-04 ASSESSMENT — PATIENT HEALTH QUESTIONNAIRE - PHQ9
9. THOUGHTS THAT YOU WOULD BE BETTER OFF DEAD, OR OF HURTING YOURSELF: 0
8. MOVING OR SPEAKING SO SLOWLY THAT OTHER PEOPLE COULD HAVE NOTICED. OR THE OPPOSITE, BEING SO FIGETY OR RESTLESS THAT YOU HAVE BEEN MOVING AROUND A LOT MORE THAN USUAL: 1
3. TROUBLE FALLING OR STAYING ASLEEP: 1
SUM OF ALL RESPONSES TO PHQ QUESTIONS 1-9: 8
1. LITTLE INTEREST OR PLEASURE IN DOING THINGS: 1
10. IF YOU CHECKED OFF ANY PROBLEMS, HOW DIFFICULT HAVE THESE PROBLEMS MADE IT FOR YOU TO DO YOUR WORK, TAKE CARE OF THINGS AT HOME, OR GET ALONG WITH OTHER PEOPLE: 1
SUM OF ALL RESPONSES TO PHQ9 QUESTIONS 1 & 2: 3
4. FEELING TIRED OR HAVING LITTLE ENERGY: 1
SUM OF ALL RESPONSES TO PHQ QUESTIONS 1-9: 8
2. FEELING DOWN, DEPRESSED OR HOPELESS: 2
SUM OF ALL RESPONSES TO PHQ QUESTIONS 1-9: 8
SUM OF ALL RESPONSES TO PHQ QUESTIONS 1-9: 8
7. TROUBLE CONCENTRATING ON THINGS, SUCH AS READING THE NEWSPAPER OR WATCHING TELEVISION: 0
5. POOR APPETITE OR OVEREATING: 1
6. FEELING BAD ABOUT YOURSELF - OR THAT YOU ARE A FAILURE OR HAVE LET YOURSELF OR YOUR FAMILY DOWN: 1

## 2023-08-04 ASSESSMENT — ENCOUNTER SYMPTOMS
CONSTIPATION: 0
VOMITING: 0
BACK PAIN: 0
WHEEZING: 0
COUGH: 0
EYE REDNESS: 0
BLOOD IN STOOL: 0
NAUSEA: 0
ABDOMINAL PAIN: 0
APNEA: 0
SHORTNESS OF BREATH: 0
COLOR CHANGE: 0
DIARRHEA: 0
CHEST TIGHTNESS: 0
TROUBLE SWALLOWING: 0

## 2023-08-04 NOTE — PROGRESS NOTES
right hip stress fx 2016  No date: Varicella  No past surgical history on file. Social History     Socioeconomic History    Marital status: Single     Spouse name: Not on file    Number of children: Not on file    Years of education: Not on file    Highest education level: Not on file   Occupational History    Occupation: retail   Tobacco Use    Smoking status: Former     Packs/day: 1.00     Types: Cigarettes     Start date: 2018     Quit date: 2018     Years since quittin.6    Smokeless tobacco: Never   Vaping Use    Vaping Use: Never used   Substance and Sexual Activity    Alcohol use: No     Comment: Prior to pregnancy    Drug use: No     Types: Marijuana Naomie Gens)     Comment: 1-2 times monthly - prior to pregnancy    Sexual activity: Not Currently     Partners: Male   Other Topics Concern    Not on file   Social History Narrative    Not on file     Social Determinants of Health     Financial Resource Strain: Medium Risk    Difficulty of Paying Living Expenses: Somewhat hard   Food Insecurity: Food Insecurity Present    Worried About Running Out of Food in the Last Year: Sometimes true    Ran Out of Food in the Last Year: Sometimes true   Transportation Needs: Unmet Transportation Needs    Lack of Transportation (Medical):  Not on file    Lack of Transportation (Non-Medical): Yes   Physical Activity: Insufficiently Active    Days of Exercise per Week: 6 days    Minutes of Exercise per Session: 20 min   Stress: Not on file   Social Connections: Not on file   Intimate Partner Violence: Not At Risk    Fear of Current or Ex-Partner: No    Emotionally Abused: No    Physically Abused: No    Sexually Abused: No   Housing Stability: High Risk    Unable to Pay for Housing in the Last Year: Not on file    Number of Places Lived in the Last Year: Not on file    Unstable Housing in the Last Year: Yes         Review of Systems   Constitutional:  Negative for appetite change, chills, diaphoresis, fatigue, fever

## 2023-08-19 ENCOUNTER — HOSPITAL ENCOUNTER (EMERGENCY)
Age: 28
Discharge: HOME | End: 2023-08-19
Attending: EMERGENCY MEDICINE
Payer: MEDICAID

## 2023-08-19 ENCOUNTER — APPOINTMENT (OUTPATIENT)
Dept: GENERAL RADIOLOGY | Age: 28
End: 2023-08-19
Payer: MEDICAID

## 2023-08-19 VITALS
WEIGHT: 97.88 LBS | SYSTOLIC BLOOD PRESSURE: 126 MMHG | RESPIRATION RATE: 18 BRPM | OXYGEN SATURATION: 98 % | TEMPERATURE: 100.1 F | DIASTOLIC BLOOD PRESSURE: 81 MMHG | BODY MASS INDEX: 16.54 KG/M2 | HEART RATE: 103 BPM

## 2023-08-19 DIAGNOSIS — J45.21 MILD INTERMITTENT ASTHMA WITH EXACERBATION: ICD-10-CM

## 2023-08-19 DIAGNOSIS — J06.9 VIRAL URI: Primary | ICD-10-CM

## 2023-08-19 DIAGNOSIS — J45.20 MILD INTERMITTENT ASTHMA WITHOUT COMPLICATION: ICD-10-CM

## 2023-08-19 LAB — SARS-COV-2 RDRP RESP QL NAA+PROBE: NOT DETECTED

## 2023-08-19 PROCEDURE — 87635 SARS-COV-2 COVID-19 AMP PRB: CPT

## 2023-08-19 PROCEDURE — 6360000002 HC RX W HCPCS: Performed by: EMERGENCY MEDICINE

## 2023-08-19 PROCEDURE — 94640 AIRWAY INHALATION TREATMENT: CPT

## 2023-08-19 PROCEDURE — 99284 EMERGENCY DEPT VISIT MOD MDM: CPT

## 2023-08-19 PROCEDURE — 6370000000 HC RX 637 (ALT 250 FOR IP): Performed by: EMERGENCY MEDICINE

## 2023-08-19 PROCEDURE — 71046 X-RAY EXAM CHEST 2 VIEWS: CPT

## 2023-08-19 RX ORDER — ALBUTEROL SULFATE 2.5 MG/3ML
2.5 SOLUTION RESPIRATORY (INHALATION) ONCE
Status: COMPLETED | OUTPATIENT
Start: 2023-08-19 | End: 2023-08-19

## 2023-08-19 RX ORDER — ALBUTEROL SULFATE 90 UG/1
4 AEROSOL, METERED RESPIRATORY (INHALATION) EVERY 4 HOURS PRN
Qty: 1 EACH | Refills: 0 | Status: SHIPPED | OUTPATIENT
Start: 2023-08-19

## 2023-08-19 RX ORDER — PREDNISONE 20 MG/1
60 TABLET ORAL ONCE
Status: COMPLETED | OUTPATIENT
Start: 2023-08-19 | End: 2023-08-19

## 2023-08-19 RX ORDER — PREDNISONE 50 MG/1
50 TABLET ORAL DAILY
Qty: 5 TABLET | Refills: 0 | Status: SHIPPED | OUTPATIENT
Start: 2023-08-20 | End: 2023-08-25

## 2023-08-19 RX ADMIN — PREDNISONE 60 MG: 20 TABLET ORAL at 18:20

## 2023-08-19 RX ADMIN — ALBUTEROL SULFATE 2.5 MG: 2.5 SOLUTION RESPIRATORY (INHALATION) at 18:12

## 2023-08-19 ASSESSMENT — PAIN DESCRIPTION - DESCRIPTORS: DESCRIPTORS: PATIENT UNABLE TO DESCRIBE

## 2023-08-19 ASSESSMENT — ENCOUNTER SYMPTOMS
WHEEZING: 1
RHINORRHEA: 1
SORE THROAT: 0
SHORTNESS OF BREATH: 0
COUGH: 1

## 2023-08-19 ASSESSMENT — PAIN SCALES - GENERAL: PAINLEVEL_OUTOF10: 5

## 2023-08-19 ASSESSMENT — PAIN DESCRIPTION - PAIN TYPE: TYPE: ACUTE PAIN

## 2023-08-19 ASSESSMENT — PAIN DESCRIPTION - LOCATION: LOCATION: CHEST;BACK

## 2023-08-19 ASSESSMENT — PAIN - FUNCTIONAL ASSESSMENT: PAIN_FUNCTIONAL_ASSESSMENT: 0-10

## 2023-08-19 NOTE — ED TRIAGE NOTES
Pt c/o chills and sweats x 3-4 days. Also reports an episode of SOB four days ago. Pt denies being exposed to anyone sick but works in a .

## 2023-08-19 NOTE — DISCHARGE INSTRUCTIONS
1 medications as directed. 2.  Follow-up with your primary care physician in the next couple of days or call [phone] for primary care referral.  3.  Return emergency department for severe worsening signs infection or shortness of breath.

## 2023-08-26 NOTE — PROGRESS NOTES
[facility]  [address]  [address] [address] [place]  Dept: [phone]      2019    Irineo Oneal (:  1995) is a 27 y.o. female, here for evaluation of the following medical concerns:    Patient here for management of HTN and to [facility] care with new PCP. [facility] [address] [place] [address] [place]. Patient reports her bp was being monitored while she was pregnant in her 3rd trimester but was not started on medication until she went for a ob visit and was sent over to L/D urgently to be induced and was started on labetolol. Patient reports she has been on medication since then, currently baby is 11days old and she is breastfeeding. Review of Systems   Constitutional: Negative for appetite change and fatigue. HENT: Negative for congestion, rhinorrhea and sore throat. Eyes: Negative for discharge and redness. Respiratory: Negative for cough and shortness of breath. Cardiovascular: Negative for chest pain and leg swelling. Gastrointestinal: Negative for constipation and diarrhea. Genitourinary: Negative for difficulty urinating and dysuria. Musculoskeletal: Negative for back pain. Skin: Negative for color change and rash. Allergic/Immunologic: Negative for food allergies. Neurological: Negative for dizziness and headaches. Psychiatric/Behavioral: Negative for decreased concentration. Prior to Visit Medications    Medication Sig Taking?  Authorizing Provider   labetalol (NORMODYNE) 200 MG tablet Take 1 tablet by mouth every 12 hours Yes Velvet Brunner, MD   Prenatal MV-Min-FA-Omega-3 (PRENATAL GUMMIES/DHA & FA) 0.4-32.5 MG CHEW Take 2 tablets by mouth daily  Historical Provider, MD        No Known Allergies    Past Medical History:   Diagnosis Date    ADHD     started meds at age 17 yo    Asthma     Depression     Headache     possible migraine hx, about 17years of age   [place] Hip fracture 26-Aug-2023 13:55

## 2024-03-09 ENCOUNTER — HOSPITAL ENCOUNTER (EMERGENCY)
Age: 29
Discharge: HOME | End: 2024-03-09
Payer: MEDICAID

## 2024-03-09 VITALS
OXYGEN SATURATION: 97 % | RESPIRATION RATE: 16 BRPM | SYSTOLIC BLOOD PRESSURE: 91 MMHG | TEMPERATURE: 98.7 F | HEART RATE: 102 BPM | BODY MASS INDEX: 16.88 KG/M2 | WEIGHT: 99.87 LBS | DIASTOLIC BLOOD PRESSURE: 62 MMHG

## 2024-03-09 DIAGNOSIS — J02.9 VIRAL PHARYNGITIS: Primary | ICD-10-CM

## 2024-03-09 LAB — S PYO AG THROAT QL: NEGATIVE

## 2024-03-09 PROCEDURE — 87081 CULTURE SCREEN ONLY: CPT

## 2024-03-09 PROCEDURE — 87880 STREP A ASSAY W/OPTIC: CPT

## 2024-03-09 PROCEDURE — 99283 EMERGENCY DEPT VISIT LOW MDM: CPT | Performed by: PHYSICIAN ASSISTANT

## 2024-03-09 PROCEDURE — 6370000000 HC RX 637 (ALT 250 FOR IP): Performed by: PHYSICIAN ASSISTANT

## 2024-03-09 RX ORDER — ACETAMINOPHEN 500 MG
1000 TABLET ORAL EVERY 8 HOURS PRN
Qty: 60 TABLET | Refills: 0 | Status: SHIPPED | OUTPATIENT
Start: 2024-03-09 | End: 2024-03-19

## 2024-03-09 RX ORDER — IBUPROFEN 400 MG/1
400 TABLET ORAL ONCE
Status: COMPLETED | OUTPATIENT
Start: 2024-03-09 | End: 2024-03-09

## 2024-03-09 RX ORDER — ACETAMINOPHEN 325 MG/1
650 TABLET ORAL ONCE
Status: COMPLETED | OUTPATIENT
Start: 2024-03-09 | End: 2024-03-09

## 2024-03-09 RX ORDER — IBUPROFEN 600 MG/1
600 TABLET ORAL EVERY 8 HOURS PRN
Qty: 21 TABLET | Refills: 0 | Status: SHIPPED | OUTPATIENT
Start: 2024-03-09 | End: 2024-03-16

## 2024-03-09 RX ORDER — BENZOCAINE/MENTH/CETYLPYRD CL 15 MG-2 MG
1 LOZENGE MUCOUS MEMBRANE EVERY 4 HOURS PRN
Qty: 18 LOZENGE | Refills: 0 | Status: SHIPPED | OUTPATIENT
Start: 2024-03-09 | End: 2024-03-16

## 2024-03-09 RX ADMIN — ACETAMINOPHEN 650 MG: 325 TABLET ORAL at 17:20

## 2024-03-09 RX ADMIN — IBUPROFEN 400 MG: 400 TABLET, FILM COATED ORAL at 17:20

## 2024-03-09 ASSESSMENT — LIFESTYLE VARIABLES: HOW OFTEN DO YOU HAVE A DRINK CONTAINING ALCOHOL: NEVER

## 2024-03-09 ASSESSMENT — PAIN SCALES - GENERAL
PAINLEVEL_OUTOF10: 7
PAINLEVEL_OUTOF10: 9

## 2024-03-09 ASSESSMENT — PAIN DESCRIPTION - DESCRIPTORS
DESCRIPTORS: SORE
DESCRIPTORS: ACHING

## 2024-03-09 ASSESSMENT — PAIN - FUNCTIONAL ASSESSMENT
PAIN_FUNCTIONAL_ASSESSMENT: 0-10
PAIN_FUNCTIONAL_ASSESSMENT: ACTIVITIES ARE NOT PREVENTED

## 2024-03-09 ASSESSMENT — PAIN DESCRIPTION - LOCATION: LOCATION: THROAT

## 2024-03-09 ASSESSMENT — PAIN DESCRIPTION - PAIN TYPE: TYPE: ACUTE PAIN

## 2024-03-09 NOTE — ED NOTES
RN to bedside due to patient yelling loudly. Pt states that she is upset that another RN did not bring food to her son. RN explained process and that food could be obtained when the patient goes to [facility] waiting room. Pt stated that she pushed call button, and no one came, and RN was \"sitting around talking about nothing\" RN suggested patient go get food from waiting room, pt stated she was waiting on RN. PA aware. RN explained that yelling in ED is inappropriate.

## 2024-03-09 NOTE — ED PROVIDER NOTES
[facility] EMERGENCY DEPARTMENT  EMERGENCY DEPARTMENT ENCOUNTER        Pt Name: Chino Byrd  MRN: [id number]  Birthdate 1995  Date of evaluation: 3/9/2024  Provider: Nasrin Alcantara PA-C  PCP: No primary care provider on file.  Note Started: 5:30 PM EST 3/9/24      SABINO. I have evaluated this patient.        CHIEF COMPLAINT       Chief Complaint   Patient presents with    Pharyngitis     Sore throat for a few days, no fever. Here w/ 4 year old.        HISTORY OF PRESENT ILLNESS: 1 or more Elements             Chino Byrd is a 29 y.o. female who presents to the emergency department with complaint of a sore throat that has been present over the past couple of days.  She does endorse some chills but denies cough.  She denies any fever.  Has not taken anything to help alleviate her symptoms.  She is here with her 4-year-old who is also having a sore throat at the beginning the week but he is better now.    Nursing Notes were all reviewed and agreed with or any disagreements were addressed in the HPI.    REVIEW OF SYSTEMS :      Review of Systems   All other systems reviewed and are negative.      Positives and Pertinent negatives as per HPI.     SURGICAL HISTORY   History reviewed. No pertinent surgical history.    CURRENTMEDICATIONS       Previous Medications    ALBUTEROL SULFATE HFA (PROVENTIL;VENTOLIN;PROAIR) 108 (90 BASE) MCG/ACT INHALER    Inhale 4 puffs into the lungs every 4 hours as needed for Wheezing    SPACER/AERO-HOLDING CHAMBERS (AEROCHAMBER MV) MISC    Please use as directed.    SPACER/AERO-HOLDING CHAMBERS (E-Z SPACER) ANUSHA    1 Device by Does not apply route daily as needed (as needed with inhaler)       ALLERGIES     Patient has no known allergies.    FAMILYHISTORY       Family History   Problem Relation Age of Onset    Mental Illness Mother     Other Mother         thyroid removed    Diabetes Father     Asthma Brother     ADHD Brother     Breast Cancer Maternal  voice recognition program.  Efforts were made to edit the dictations but occasionally words are mis-transcribed.)    Nasrin Alcantara PA-C (electronically signed)            Nasrin Alcantara PA-C  03/09/24 [id number]

## 2024-03-10 LAB — S PYO THROAT QL CULT: NORMAL

## 2024-03-12 LAB — S PYO THROAT QL CULT: NORMAL
